# Patient Record
Sex: FEMALE | Race: BLACK OR AFRICAN AMERICAN | NOT HISPANIC OR LATINO | Employment: STUDENT | URBAN - METROPOLITAN AREA
[De-identification: names, ages, dates, MRNs, and addresses within clinical notes are randomized per-mention and may not be internally consistent; named-entity substitution may affect disease eponyms.]

---
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
## 2020-07-23 ENCOUNTER — TELEPHONE (OUTPATIENT)
Dept: PEDIATRICS | Facility: CLINIC | Age: 20
End: 2020-07-23

## 2020-07-23 NOTE — TELEPHONE ENCOUNTER
Spoke to patient, informed her shot records are ready for pickup at [facility]. Filed in front.   ----- Message from Daly Doyle sent at 7/23/2020  8:04 AM CDT -----  Contact: Patient [phone]  Requesting immunization records.    Mail to address listed in medical record:  pickup    Additional Information:  Calling to request a copy of a shot record. Patient would like a call back once shot record is ready for pickup.

## 2020-07-23 NOTE — TELEPHONE ENCOUNTER
Faxed shot records to number provided.   ----- Message from Kiesha Trujillo sent at 7/23/2020 11:54 AM CDT -----  Regarding: shot record  Contact: Tabby [phone]      Patient is calling for shot record, please fax to [phone]      Thank you

## 2021-05-19 ENCOUNTER — OFFICE VISIT (OUTPATIENT)
Dept: PRIMARY CARE CLINIC | Facility: CLINIC | Age: 21
End: 2021-05-19
Payer: COMMERCIAL

## 2021-05-19 ENCOUNTER — LAB VISIT (OUTPATIENT)
Dept: LAB | Facility: HOSPITAL | Age: 21
End: 2021-05-19
Attending: FAMILY MEDICINE
Payer: COMMERCIAL

## 2021-05-19 VITALS
OXYGEN SATURATION: 98 % | SYSTOLIC BLOOD PRESSURE: 99 MMHG | DIASTOLIC BLOOD PRESSURE: 70 MMHG | WEIGHT: 174.25 LBS | BODY MASS INDEX: 25.74 KG/M2 | HEART RATE: 74 BPM | TEMPERATURE: 98 F

## 2021-05-19 DIAGNOSIS — F33.1 MODERATE EPISODE OF RECURRENT MAJOR DEPRESSIVE DISORDER: ICD-10-CM

## 2021-05-19 DIAGNOSIS — F41.1 GAD (GENERALIZED ANXIETY DISORDER): ICD-10-CM

## 2021-05-19 DIAGNOSIS — F41.1 GAD (GENERALIZED ANXIETY DISORDER): Primary | ICD-10-CM

## 2021-05-19 LAB
ALBUMIN SERPL BCP-MCNC: 3.9 G/DL (ref 3.5–5.2)
ALP SERPL-CCNC: 82 U/L (ref 55–135)
ALT SERPL W/O P-5'-P-CCNC: 10 U/L (ref 10–44)
ANION GAP SERPL CALC-SCNC: 8 MMOL/L (ref 8–16)
AST SERPL-CCNC: 24 U/L (ref 10–40)
BASOPHILS # BLD AUTO: 0.03 K/UL (ref 0–0.2)
BASOPHILS NFR BLD: 0.3 % (ref 0–1.9)
BILIRUB SERPL-MCNC: 0.3 MG/DL (ref 0.1–1)
BUN SERPL-MCNC: 12 MG/DL (ref 6–20)
CALCIUM SERPL-MCNC: 9.8 MG/DL (ref 8.7–10.5)
CHLORIDE SERPL-SCNC: 103 MMOL/L (ref 95–110)
CO2 SERPL-SCNC: 27 MMOL/L (ref 23–29)
CREAT SERPL-MCNC: 0.9 MG/DL (ref 0.5–1.4)
DIFFERENTIAL METHOD: ABNORMAL
EOSINOPHIL # BLD AUTO: 0.1 K/UL (ref 0–0.5)
EOSINOPHIL NFR BLD: 1.4 % (ref 0–8)
ERYTHROCYTE [DISTWIDTH] IN BLOOD BY AUTOMATED COUNT: 15.4 % (ref 11.5–14.5)
EST. GFR  (AFRICAN AMERICAN): >60 ML/MIN/1.73 M^2
EST. GFR  (NON AFRICAN AMERICAN): >60 ML/MIN/1.73 M^2
ESTIMATED AVG GLUCOSE: 103 MG/DL (ref 68–131)
GLUCOSE SERPL-MCNC: 77 MG/DL (ref 70–110)
HBA1C MFR BLD: 5.2 % (ref 4–5.6)
HCT VFR BLD AUTO: 40 % (ref 37–48.5)
HGB BLD-MCNC: 12 G/DL (ref 12–16)
IMM GRANULOCYTES # BLD AUTO: 0.05 K/UL (ref 0–0.04)
IMM GRANULOCYTES NFR BLD AUTO: 0.6 % (ref 0–0.5)
LYMPHOCYTES # BLD AUTO: 2 K/UL (ref 1–4.8)
LYMPHOCYTES NFR BLD: 23.2 % (ref 18–48)
MCH RBC QN AUTO: 26.4 PG (ref 27–31)
MCHC RBC AUTO-ENTMCNC: 30 G/DL (ref 32–36)
MCV RBC AUTO: 88 FL (ref 82–98)
MONOCYTES # BLD AUTO: 0.7 K/UL (ref 0.3–1)
MONOCYTES NFR BLD: 8.4 % (ref 4–15)
NEUTROPHILS # BLD AUTO: 5.8 K/UL (ref 1.8–7.7)
NEUTROPHILS NFR BLD: 66.1 % (ref 38–73)
NRBC BLD-RTO: 0 /100 WBC
PLATELET # BLD AUTO: 283 K/UL (ref 150–450)
PMV BLD AUTO: 12.6 FL (ref 9.2–12.9)
POTASSIUM SERPL-SCNC: 4 MMOL/L (ref 3.5–5.1)
PROT SERPL-MCNC: 7.9 G/DL (ref 6–8.4)
RBC # BLD AUTO: 4.54 M/UL (ref 4–5.4)
SODIUM SERPL-SCNC: 138 MMOL/L (ref 136–145)
T4 FREE SERPL-MCNC: 0.92 NG/DL (ref 0.71–1.51)
TSH SERPL DL<=0.005 MIU/L-ACNC: 0.32 UIU/ML (ref 0.4–4)
WBC # BLD AUTO: 8.79 K/UL (ref 3.9–12.7)

## 2021-05-19 PROCEDURE — 84443 ASSAY THYROID STIM HORMONE: CPT | Performed by: FAMILY MEDICINE

## 2021-05-19 PROCEDURE — 99204 PR OFFICE/OUTPT VISIT, NEW, LEVL IV, 45-59 MIN: ICD-10-PCS | Mod: S$GLB,,, | Performed by: FAMILY MEDICINE

## 2021-05-19 PROCEDURE — 1126F AMNT PAIN NOTED NONE PRSNT: CPT | Mod: S$GLB,,, | Performed by: FAMILY MEDICINE

## 2021-05-19 PROCEDURE — 3008F BODY MASS INDEX DOCD: CPT | Mod: CPTII,S$GLB,, | Performed by: FAMILY MEDICINE

## 2021-05-19 PROCEDURE — 99999 PR PBB SHADOW E&M-EST. PATIENT-LVL IV: CPT | Mod: PBBFAC,,, | Performed by: FAMILY MEDICINE

## 2021-05-19 PROCEDURE — 3008F PR BODY MASS INDEX (BMI) DOCUMENTED: ICD-10-PCS | Mod: CPTII,S$GLB,, | Performed by: FAMILY MEDICINE

## 2021-05-19 PROCEDURE — 99999 PR PBB SHADOW E&M-EST. PATIENT-LVL IV: ICD-10-PCS | Mod: PBBFAC,,, | Performed by: FAMILY MEDICINE

## 2021-05-19 PROCEDURE — 85025 COMPLETE CBC W/AUTO DIFF WBC: CPT | Performed by: FAMILY MEDICINE

## 2021-05-19 PROCEDURE — 36415 COLL VENOUS BLD VENIPUNCTURE: CPT | Mod: PN | Performed by: FAMILY MEDICINE

## 2021-05-19 PROCEDURE — 83036 HEMOGLOBIN GLYCOSYLATED A1C: CPT | Performed by: FAMILY MEDICINE

## 2021-05-19 PROCEDURE — 80053 COMPREHEN METABOLIC PANEL: CPT | Performed by: FAMILY MEDICINE

## 2021-05-19 PROCEDURE — 99204 OFFICE O/P NEW MOD 45 MIN: CPT | Mod: S$GLB,,, | Performed by: FAMILY MEDICINE

## 2021-05-19 PROCEDURE — 1126F PR PAIN SEVERITY QUANTIFIED, NO PAIN PRESENT: ICD-10-PCS | Mod: S$GLB,,, | Performed by: FAMILY MEDICINE

## 2021-05-19 PROCEDURE — 84439 ASSAY OF FREE THYROXINE: CPT | Performed by: FAMILY MEDICINE

## 2021-05-19 RX ORDER — SERTRALINE HYDROCHLORIDE 50 MG/1
50 TABLET, FILM COATED ORAL DAILY
Qty: 30 TABLET | Refills: 11 | Status: SHIPPED | OUTPATIENT
Start: 2021-05-19 | End: 2022-05-26

## 2021-05-20 DIAGNOSIS — E05.90 HYPERTHYROIDISM: Primary | ICD-10-CM

## 2022-03-28 ENCOUNTER — TELEPHONE (OUTPATIENT)
Dept: PRIMARY CARE CLINIC | Facility: CLINIC | Age: 22
End: 2022-03-28
Payer: COMMERCIAL

## 2022-03-28 NOTE — TELEPHONE ENCOUNTER
----- Message from Zoilablossom Taylor sent at 3/28/2022  1:40 PM CDT -----  Type: Patient Call Back    Who called: self    What is the request in detail: pt called in regards to her antidepressant medication. Pt states that the pharmacy informed her that they need verification from the doctor and the pt really needs their medication. Please advise    Can the clinic reply by [facility]? No     Would the patient rather a call back or a response via [facility]? Call     Best call back number:.[phone]

## 2022-04-27 ENCOUNTER — PATIENT MESSAGE (OUTPATIENT)
Dept: ADMINISTRATIVE | Facility: HOSPITAL | Age: 22
End: 2022-04-27
Payer: COMMERCIAL

## 2022-05-26 RX ORDER — SERTRALINE HYDROCHLORIDE 50 MG/1
TABLET, FILM COATED ORAL
Qty: 90 TABLET | Refills: 0 | Status: SHIPPED | OUTPATIENT
Start: 2022-05-26 | End: 2022-09-23 | Stop reason: SDUPTHER

## 2022-05-26 NOTE — TELEPHONE ENCOUNTER
Refill Authorization Note   Tabby LEWIS Miki  is requesting a refill authorization.  Brief Assessment and Rationale for Refill:  Approve    -Medication-Related Problems Identified: Requires appointment  Medication Therapy Plan:  Needs an OV with PCP, lov 05/19/21    Medication Reconciliation Completed: No   Comments:     Provider Staff:     Action is required for this patient.   Please see care gap opportunities below in Care Due Message.     Thanks!  [facility]     Appointments      Date Provider   Last Visit   5/19/2021 Chelsea Morales MD   Next Visit   Visit date not found Chelsea Morales MD     Note composed:3:56 PM 05/26/2022           Note composed:3:56 PM 05/26/2022

## 2022-05-26 NOTE — TELEPHONE ENCOUNTER
No new care gaps identified.  [facility] Embedded Care Gaps. Reference number: [id number]. 5/26/2022   1:50:18 PM CDT

## 2022-05-27 NOTE — ED PROVIDER NOTES
Addended by: RASHARD TALBOT on: 5/27/2022 04:45 PM     Modules accepted: Orders     Encounter Date: 2019       History     Chief Complaint   Patient presents with    Female  Problem     pain and discomfort. recently seen     This is usually healthy 18-year-old girl who was diagnosed with gonorrhea and genital herpes 2 nights ago.  She was treated with azithromycin and ceftriaxone in the emergency room.  She had diarrhea as a result that but that has cleared.  She was started on valacyclovir for her genital herpes.  She continues to take that.  She was also started on metronidazole for bacterial vaginosis.  She is here because her herpes hurts so bad she can't walk, urinate, or sick comfortably.    The patient has never had herpes before.  This is a primary outbreak.  She is sexually active and has had at least 2 partners, 1 male and 1 female.  She has notified these partners of her symptoms and diagnosis.    The patient is able to urinate though it hurts.    Past medical history hospitalizations:  None  Surgeries:  None  Allergies:  None  Medications:  Metronidazole, Valcyte clear, ibuprofen  Immunizations:  Up-to-date including human papilloma virus        Review of patient's allergies indicates:  No Known Allergies  Past Medical History:   Diagnosis Date    Cough      Past Surgical History:   Procedure Laterality Date    ADENOIDECTOMY      TYMPANOSTOMY TUBE PLACEMENT       Family History   Problem Relation Age of Onset    Heart disease Mother          at 54 from heart attack    Hypertension Mother     Early death Brother          from seizure in 20's    Heart disease Maternal Aunt          from HA in 50's     Social History     Tobacco Use    Smoking status: Never Smoker    Smokeless tobacco: Never Used   Substance Use Topics    Alcohol use: Yes     Frequency: Never    Drug use: Yes     Types: Marijuana     Review of Systems   Constitutional: Negative.    HENT: Negative.    Eyes: Negative.    Respiratory: Negative.    Cardiovascular: Negative.     Gastrointestinal: Negative.    Genitourinary: Positive for dysuria, genital sores and vaginal pain. Negative for decreased urine volume and urgency.   Musculoskeletal: Negative.    Skin: Positive for rash.        Vulvar lesions consistent with genital herpes   Neurological: Negative.    Hematological: Negative.        Physical Exam     Initial Vitals [04/17/19 0344]   BP Pulse Resp Temp SpO2   -- 60 -- 98.3 °F (36.8 °C) 99 %      MAP       --         Physical Exam    Constitutional: She appears well-developed and well-nourished. She is not diaphoretic. No distress.   HENT:   Nose: Nose normal.   Mouth/Throat: Oropharynx is clear and moist.   Eyes: EOM are normal. Pupils are equal, round, and reactive to light.   Neck: Normal range of motion. Neck supple.   Cardiovascular: Normal rate, regular rhythm and normal heart sounds.   Pulmonary/Chest: Breath sounds normal.   Abdominal: Soft. Bowel sounds are normal. She exhibits no distension. There is no tenderness. There is no rebound.   Genitourinary:   Genitourinary Comments: She has multiple excoriated lesions in her vulva consistent with genital herpes.  They are small vesicles, some coalescent, on a very red base.  She does not have signs of super infection.  There is no significant pus or erythema outside the lesions.  There is no significant swelling.   Neurological: She is alert and oriented to person, place, and time. She has normal strength. GCS score is 15. GCS eye subscore is 4. GCS verbal subscore is 5. GCS motor subscore is 6.   Skin: Skin is warm and dry.   Lesions consistent with genital herpes and vulva   Psychiatric: She has a normal mood and affect.         ED Course   Procedures  Labs Reviewed - No data to display       Imaging Results    None          Medical Decision Making:   Initial Assessment:   Problem 1.:  Pain secondary to genital herpes.  She has been taking ibuprofen without relief.  She is obviously very uncomfortable especially if she  walks back to the room.  Patient was given oxycodone here.  She was discharged home with a prescription for 8 Percocet, to be used every 6 hr as needed.  Hopefully, by 2 days for the acyclovir would have started to decrease her pain    Problem 2.:  Itching associated with her genital herpes:  Patient was given a dose of Benadryl here and a prescription for same.    Problem 3.:  Std counseling:  I told the patient that she had gonorrhea in addition to her genital herpes.  We spoke at length about transmission of STDs.  She was given information about same.    Problem 4.:  Birth control counseling:  The patient does not use birth control.  She has sex with both men and women and thus is at risk for pregnancy.  I discussed her risk.  I have instructed her follow up with gynecology and ask about a full prove, easy to remember, birth control plan.  Differential Diagnosis:   Genital herpes, genital warts, syphilis                      Clinical Impression:       ICD-10-CM ICD-9-CM   1. Genital herpes simplex, unspecified site A60.00 054.10                                Micaela Sanches MD  04/17/19 0439

## 2022-09-06 ENCOUNTER — PATIENT MESSAGE (OUTPATIENT)
Dept: PRIMARY CARE CLINIC | Facility: CLINIC | Age: 22
End: 2022-09-06
Payer: COMMERCIAL

## 2022-09-12 ENCOUNTER — TELEPHONE (OUTPATIENT)
Dept: PRIMARY CARE CLINIC | Facility: CLINIC | Age: 22
End: 2022-09-12
Payer: COMMERCIAL

## 2022-09-12 NOTE — TELEPHONE ENCOUNTER
----- Message from Caity Moyer sent at 9/12/2022  1:49 PM CDT -----  Contact: Pt  Type:  RX Refill Request    Who Called:  pt   Refill or New Rx:refill   RX Name and Strength: sertraline (ZOLOFT) 50 MG tablet  How is the patient currently taking it? (ex. 1XDay):once daily   Is this a 30 day or 90 day RX: 90 days   [facility] with phone [facility]   Local or Mail Order: local   Ordering Provider: love   Would the patient rather a call back or a response via [facility]? phone  Best Call Back Number: [phone]  Additional Information:  pt is out of the medicine and would like to have it called in today and pls call when it's been called       [id number] [address] & [address] [address]  Phone: [phone] Fax: [phone]

## 2022-09-12 NOTE — TELEPHONE ENCOUNTER
Called patient and offer her an appointment with CAREY Reid due to patient has not been seen since may of 2021. Patient advised she prefer to see a provider close to [facility].

## 2022-09-12 NOTE — TELEPHONE ENCOUNTER
----- Message from Caity Moyer sent at 9/12/2022  1:49 PM CDT -----  Contact: Pt  Type:  RX Refill Request    Who Called:  pt   Refill or New Rx:refill   RX Name and Strength: sertraline (ZOLOFT) 50 MG tablet  How is the patient currently taking it? (ex. 1XDay):once daily   Is this a 30 day or 90 day RX: 90 days   Preferred Pharmacy with phone number[facility]   Local or Mail Order: local   Ordering Provider: love   Would the patient rather a call back or a response via [facility]? phone  Best Call Back Number: [phone]  Additional Information:  pt is out of the medicine and would like to have it called in today and pls call when it's been called       [id number] [address] & [address] [address]  Phone: [phone] Fax: [phone]

## 2022-09-23 ENCOUNTER — OFFICE VISIT (OUTPATIENT)
Dept: PRIMARY CARE CLINIC | Facility: CLINIC | Age: 22
End: 2022-09-23
Payer: COMMERCIAL

## 2022-09-23 VITALS
SYSTOLIC BLOOD PRESSURE: 126 MMHG | HEART RATE: 99 BPM | TEMPERATURE: 98 F | BODY MASS INDEX: 26.08 KG/M2 | HEIGHT: 69 IN | DIASTOLIC BLOOD PRESSURE: 76 MMHG | OXYGEN SATURATION: 95 % | WEIGHT: 176.06 LBS

## 2022-09-23 DIAGNOSIS — Z00.00 WELLNESS EXAMINATION: ICD-10-CM

## 2022-09-23 DIAGNOSIS — Z12.4 CERVICAL CANCER SCREENING: ICD-10-CM

## 2022-09-23 DIAGNOSIS — F41.1 GAD (GENERALIZED ANXIETY DISORDER): Primary | ICD-10-CM

## 2022-09-23 PROCEDURE — 99999 PR PBB SHADOW E&M-EST. PATIENT-LVL V: CPT | Mod: PBBFAC,,, | Performed by: PHYSICIAN ASSISTANT

## 2022-09-23 PROCEDURE — 3008F BODY MASS INDEX DOCD: CPT | Mod: CPTII,S$GLB,, | Performed by: PHYSICIAN ASSISTANT

## 2022-09-23 PROCEDURE — 3008F PR BODY MASS INDEX (BMI) DOCUMENTED: ICD-10-PCS | Mod: CPTII,S$GLB,, | Performed by: PHYSICIAN ASSISTANT

## 2022-09-23 PROCEDURE — 99999 PR PBB SHADOW E&M-EST. PATIENT-LVL V: ICD-10-PCS | Mod: PBBFAC,,, | Performed by: PHYSICIAN ASSISTANT

## 2022-09-23 PROCEDURE — 3078F PR MOST RECENT DIASTOLIC BLOOD PRESSURE < 80 MM HG: ICD-10-PCS | Mod: CPTII,S$GLB,, | Performed by: PHYSICIAN ASSISTANT

## 2022-09-23 PROCEDURE — 1160F RVW MEDS BY RX/DR IN RCRD: CPT | Mod: CPTII,S$GLB,, | Performed by: PHYSICIAN ASSISTANT

## 2022-09-23 PROCEDURE — 1160F PR REVIEW ALL MEDS BY PRESCRIBER/CLIN PHARMACIST DOCUMENTED: ICD-10-PCS | Mod: CPTII,S$GLB,, | Performed by: PHYSICIAN ASSISTANT

## 2022-09-23 PROCEDURE — 3074F SYST BP LT 130 MM HG: CPT | Mod: CPTII,S$GLB,, | Performed by: PHYSICIAN ASSISTANT

## 2022-09-23 PROCEDURE — 3078F DIAST BP <80 MM HG: CPT | Mod: CPTII,S$GLB,, | Performed by: PHYSICIAN ASSISTANT

## 2022-09-23 PROCEDURE — 1159F MED LIST DOCD IN RCRD: CPT | Mod: CPTII,S$GLB,, | Performed by: PHYSICIAN ASSISTANT

## 2022-09-23 PROCEDURE — 1159F PR MEDICATION LIST DOCUMENTED IN MEDICAL RECORD: ICD-10-PCS | Mod: CPTII,S$GLB,, | Performed by: PHYSICIAN ASSISTANT

## 2022-09-23 PROCEDURE — 3074F PR MOST RECENT SYSTOLIC BLOOD PRESSURE < 130 MM HG: ICD-10-PCS | Mod: CPTII,S$GLB,, | Performed by: PHYSICIAN ASSISTANT

## 2022-09-23 PROCEDURE — 99214 OFFICE O/P EST MOD 30 MIN: CPT | Mod: S$GLB,,, | Performed by: PHYSICIAN ASSISTANT

## 2022-09-23 PROCEDURE — 99214 PR OFFICE/OUTPT VISIT, EST, LEVL IV, 30-39 MIN: ICD-10-PCS | Mod: S$GLB,,, | Performed by: PHYSICIAN ASSISTANT

## 2022-09-23 RX ORDER — SERTRALINE HYDROCHLORIDE 50 MG/1
50 TABLET, FILM COATED ORAL DAILY
Qty: 90 TABLET | Refills: 0 | Status: SHIPPED | OUTPATIENT
Start: 2022-09-23 | End: 2022-09-23

## 2022-09-23 RX ORDER — SERTRALINE HYDROCHLORIDE 50 MG/1
50 TABLET, FILM COATED ORAL DAILY
Qty: 90 TABLET | Refills: 3 | Status: SHIPPED | OUTPATIENT
Start: 2022-09-23 | End: 2023-11-09 | Stop reason: SDUPTHER
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
## 2023-01-25 ENCOUNTER — PATIENT MESSAGE (OUTPATIENT)
Dept: ADMINISTRATIVE | Facility: HOSPITAL | Age: 23
End: 2023-01-25
Payer: COMMERCIAL

## 2023-06-13 ENCOUNTER — PATIENT OUTREACH (OUTPATIENT)
Dept: ADMINISTRATIVE | Facility: HOSPITAL | Age: 23
End: 2023-06-13
Payer: COMMERCIAL

## 2023-11-08 ENCOUNTER — TELEPHONE (OUTPATIENT)
Dept: PRIMARY CARE CLINIC | Facility: CLINIC | Age: 23
End: 2023-11-08

## 2023-11-08 NOTE — TELEPHONE ENCOUNTER
----- Message from Sandra Mayorga sent at 11/8/2023 11:12 AM CST -----  Contact: Tabby [phone]  Pt needs a refill on sertraline (ZOLOFT) 50 MG tablet called into     [id number] [place] [id number] [facility] AT [facility] & [facility]  [address] [place] [address]  Phone: [phone] Fax: [phone]      Pt mom/dad/guardian can be reached at [phone]    Tabby would like to know if she can get a refill on her RX that provider Chelsea Reid prescribes. Tabby states she is completely out and needs to get a refill asap. Please call the pt back for advice  ______________________________________________________________________________    Would like to receive medical advice.    Would they like a call back or a response via [facility]: call back     Additional information:      Tabby states if she needs to do a med check visit can it be virtual with Chelsea Reid due to being in [place] at the moment and needs to get her RX refilled. Please call Tabby back for advice.

## 2023-11-09 ENCOUNTER — OFFICE VISIT (OUTPATIENT)
Dept: FAMILY MEDICINE | Facility: CLINIC | Age: 23
End: 2023-11-09
Payer: COMMERCIAL

## 2023-11-09 VITALS
SYSTOLIC BLOOD PRESSURE: 111 MMHG | HEART RATE: 73 BPM | DIASTOLIC BLOOD PRESSURE: 75 MMHG | WEIGHT: 174 LBS | OXYGEN SATURATION: 100 % | HEIGHT: 69 IN | BODY MASS INDEX: 25.77 KG/M2

## 2023-11-09 DIAGNOSIS — F41.9 ANXIETY: ICD-10-CM

## 2023-11-09 DIAGNOSIS — Z00.00 WELLNESS EXAMINATION: ICD-10-CM

## 2023-11-09 DIAGNOSIS — Z23 ENCOUNTER FOR IMMUNIZATION: Primary | ICD-10-CM

## 2023-11-09 DIAGNOSIS — N94.6 MENSTRUAL CRAMPS: ICD-10-CM

## 2023-11-09 DIAGNOSIS — B00.1 HERPES LABIALIS: ICD-10-CM

## 2023-11-09 DIAGNOSIS — Z86.19 HISTORY OF COLD SORES: ICD-10-CM

## 2023-11-09 DIAGNOSIS — F41.1 GAD (GENERALIZED ANXIETY DISORDER): ICD-10-CM

## 2023-11-09 LAB
ALBUMIN SERPL-MCNC: 3.8 G/DL (ref 3.5–5)
ALBUMIN/GLOB SERPL: 1.2 RATIO (ref 1.1–2)
ALP SERPL-CCNC: 49 UNIT/L (ref 40–150)
ALT SERPL-CCNC: 70 UNIT/L (ref 0–55)
APPEARANCE UR: CLEAR
AST SERPL-CCNC: 293 UNIT/L (ref 5–34)
BACTERIA #/AREA URNS AUTO: ABNORMAL /HPF
BASOPHILS # BLD AUTO: 0.06 X10(3)/MCL
BASOPHILS NFR BLD AUTO: 0.5 %
BILIRUB SERPL-MCNC: 0.7 MG/DL
BILIRUB UR QL STRIP.AUTO: NEGATIVE
BUN SERPL-MCNC: 9.6 MG/DL (ref 7–18.7)
CALCIUM SERPL-MCNC: 9.3 MG/DL (ref 8.4–10.2)
CHLORIDE SERPL-SCNC: 107 MMOL/L (ref 98–107)
CHOLEST SERPL-MCNC: 141 MG/DL
CHOLEST/HDLC SERPL: 2 {RATIO} (ref 0–5)
CO2 SERPL-SCNC: 27 MMOL/L (ref 22–29)
COLOR UR AUTO: ABNORMAL
CREAT SERPL-MCNC: 0.98 MG/DL (ref 0.55–1.02)
DEPRECATED CALCIDIOL+CALCIFEROL SERPL-MC: 11.4 NG/ML (ref 30–80)
EOSINOPHIL # BLD AUTO: 0.18 X10(3)/MCL (ref 0–0.9)
EOSINOPHIL NFR BLD AUTO: 1.6 %
ERYTHROCYTE [DISTWIDTH] IN BLOOD BY AUTOMATED COUNT: 16 % (ref 11.5–17)
EST. AVERAGE GLUCOSE BLD GHB EST-MCNC: 102.5 MG/DL
GFR SERPLBLD CREATININE-BSD FMLA CKD-EPI: >60 MLS/MIN/1.73/M2
GLOBULIN SER-MCNC: 3.2 GM/DL (ref 2.4–3.5)
GLUCOSE SERPL-MCNC: 72 MG/DL (ref 74–100)
GLUCOSE UR QL STRIP.AUTO: NORMAL
HBA1C MFR BLD: 5.2 %
HCT VFR BLD AUTO: 37.7 % (ref 37–47)
HDLC SERPL-MCNC: 65 MG/DL (ref 35–60)
HGB BLD-MCNC: 11.2 G/DL (ref 12–16)
HYALINE CASTS #/AREA URNS LPF: ABNORMAL /LPF
IMM GRANULOCYTES # BLD AUTO: 0.04 X10(3)/MCL (ref 0–0.04)
IMM GRANULOCYTES NFR BLD AUTO: 0.4 %
KETONES UR QL STRIP.AUTO: NEGATIVE
LDLC SERPL CALC-MCNC: 67 MG/DL (ref 50–140)
LEUKOCYTE ESTERASE UR QL STRIP.AUTO: NEGATIVE
LYMPHOCYTES # BLD AUTO: 2.37 X10(3)/MCL (ref 0.6–4.6)
LYMPHOCYTES NFR BLD AUTO: 21.3 %
MCH RBC QN AUTO: 25 PG (ref 27–31)
MCHC RBC AUTO-ENTMCNC: 29.7 G/DL (ref 33–36)
MCV RBC AUTO: 84.2 FL (ref 80–94)
MONOCYTES # BLD AUTO: 0.78 X10(3)/MCL (ref 0.1–1.3)
MONOCYTES NFR BLD AUTO: 7 %
MUCOUS THREADS URNS QL MICRO: ABNORMAL /LPF
NEUTROPHILS # BLD AUTO: 7.69 X10(3)/MCL (ref 2.1–9.2)
NEUTROPHILS NFR BLD AUTO: 69.2 %
NITRITE UR QL STRIP.AUTO: NEGATIVE
NRBC BLD AUTO-RTO: 0 %
PH UR STRIP.AUTO: 5.5 [PH]
PLATELET # BLD AUTO: 285 X10(3)/MCL (ref 130–400)
PMV BLD AUTO: 11.7 FL (ref 7.4–10.4)
POTASSIUM SERPL-SCNC: 4.7 MMOL/L (ref 3.5–5.1)
PROT SERPL-MCNC: 7 GM/DL (ref 6.4–8.3)
PROT UR QL STRIP.AUTO: ABNORMAL
RBC # BLD AUTO: 4.48 X10(6)/MCL (ref 4.2–5.4)
RBC #/AREA URNS AUTO: ABNORMAL /HPF
RBC UR QL AUTO: NEGATIVE
SODIUM SERPL-SCNC: 140 MMOL/L (ref 136–145)
SP GR UR STRIP.AUTO: 1.02 (ref 1–1.03)
SQUAMOUS #/AREA URNS LPF: ABNORMAL /HPF
T4 FREE SERPL-MCNC: 0.92 NG/DL (ref 0.7–1.48)
TRIGL SERPL-MCNC: 43 MG/DL (ref 37–140)
TSH SERPL-ACNC: 1.06 UIU/ML (ref 0.35–4.94)
UROBILINOGEN UR STRIP-ACNC: NORMAL
VLDLC SERPL CALC-MCNC: 9 MG/DL
WBC # SPEC AUTO: 11.12 X10(3)/MCL (ref 4.5–11.5)
WBC #/AREA URNS AUTO: ABNORMAL /HPF

## 2023-11-09 PROCEDURE — 99395 PR PREVENTIVE VISIT,EST,18-39: ICD-10-PCS | Mod: S$PBB,,, | Performed by: STUDENT IN AN ORGANIZED HEALTH CARE EDUCATION/TRAINING PROGRAM

## 2023-11-09 PROCEDURE — 82306 VITAMIN D 25 HYDROXY: CPT | Performed by: STUDENT IN AN ORGANIZED HEALTH CARE EDUCATION/TRAINING PROGRAM

## 2023-11-09 PROCEDURE — 3074F PR MOST RECENT SYSTOLIC BLOOD PRESSURE < 130 MM HG: ICD-10-PCS | Mod: CPTII,,, | Performed by: STUDENT IN AN ORGANIZED HEALTH CARE EDUCATION/TRAINING PROGRAM

## 2023-11-09 PROCEDURE — 99395 PREV VISIT EST AGE 18-39: CPT | Mod: S$PBB,,, | Performed by: STUDENT IN AN ORGANIZED HEALTH CARE EDUCATION/TRAINING PROGRAM

## 2023-11-09 PROCEDURE — 84439 ASSAY OF FREE THYROXINE: CPT | Performed by: STUDENT IN AN ORGANIZED HEALTH CARE EDUCATION/TRAINING PROGRAM

## 2023-11-09 PROCEDURE — 81001 URINALYSIS AUTO W/SCOPE: CPT | Performed by: STUDENT IN AN ORGANIZED HEALTH CARE EDUCATION/TRAINING PROGRAM

## 2023-11-09 PROCEDURE — 3078F PR MOST RECENT DIASTOLIC BLOOD PRESSURE < 80 MM HG: ICD-10-PCS | Mod: CPTII,,, | Performed by: STUDENT IN AN ORGANIZED HEALTH CARE EDUCATION/TRAINING PROGRAM

## 2023-11-09 PROCEDURE — 85025 COMPLETE CBC W/AUTO DIFF WBC: CPT | Performed by: STUDENT IN AN ORGANIZED HEALTH CARE EDUCATION/TRAINING PROGRAM

## 2023-11-09 PROCEDURE — 83036 HEMOGLOBIN GLYCOSYLATED A1C: CPT | Performed by: STUDENT IN AN ORGANIZED HEALTH CARE EDUCATION/TRAINING PROGRAM

## 2023-11-09 PROCEDURE — 3074F SYST BP LT 130 MM HG: CPT | Mod: CPTII,,, | Performed by: STUDENT IN AN ORGANIZED HEALTH CARE EDUCATION/TRAINING PROGRAM

## 2023-11-09 PROCEDURE — 80061 LIPID PANEL: CPT | Performed by: STUDENT IN AN ORGANIZED HEALTH CARE EDUCATION/TRAINING PROGRAM

## 2023-11-09 PROCEDURE — 90471 IMMUNIZATION ADMIN: CPT | Mod: PBBFAC,PN

## 2023-11-09 PROCEDURE — 99213 OFFICE O/P EST LOW 20 MIN: CPT | Mod: PBBFAC,PN | Performed by: STUDENT IN AN ORGANIZED HEALTH CARE EDUCATION/TRAINING PROGRAM

## 2023-11-09 PROCEDURE — 3008F PR BODY MASS INDEX (BMI) DOCUMENTED: ICD-10-PCS | Mod: CPTII,,, | Performed by: STUDENT IN AN ORGANIZED HEALTH CARE EDUCATION/TRAINING PROGRAM

## 2023-11-09 PROCEDURE — 3008F BODY MASS INDEX DOCD: CPT | Mod: CPTII,,, | Performed by: STUDENT IN AN ORGANIZED HEALTH CARE EDUCATION/TRAINING PROGRAM

## 2023-11-09 PROCEDURE — 1159F PR MEDICATION LIST DOCUMENTED IN MEDICAL RECORD: ICD-10-PCS | Mod: CPTII,,, | Performed by: STUDENT IN AN ORGANIZED HEALTH CARE EDUCATION/TRAINING PROGRAM

## 2023-11-09 PROCEDURE — 80053 COMPREHEN METABOLIC PANEL: CPT | Performed by: STUDENT IN AN ORGANIZED HEALTH CARE EDUCATION/TRAINING PROGRAM

## 2023-11-09 PROCEDURE — 84443 ASSAY THYROID STIM HORMONE: CPT | Performed by: STUDENT IN AN ORGANIZED HEALTH CARE EDUCATION/TRAINING PROGRAM

## 2023-11-09 PROCEDURE — 3078F DIAST BP <80 MM HG: CPT | Mod: CPTII,,, | Performed by: STUDENT IN AN ORGANIZED HEALTH CARE EDUCATION/TRAINING PROGRAM

## 2023-11-09 PROCEDURE — 1159F MED LIST DOCD IN RCRD: CPT | Mod: CPTII,,, | Performed by: STUDENT IN AN ORGANIZED HEALTH CARE EDUCATION/TRAINING PROGRAM

## 2023-11-09 PROCEDURE — 36415 COLL VENOUS BLD VENIPUNCTURE: CPT | Performed by: STUDENT IN AN ORGANIZED HEALTH CARE EDUCATION/TRAINING PROGRAM

## 2023-11-09 RX ORDER — SERTRALINE HYDROCHLORIDE 50 MG/1
50 TABLET, FILM COATED ORAL DAILY
Qty: 90 TABLET | Refills: 3 | Status: SHIPPED | OUTPATIENT
Start: 2023-11-09 | End: 2024-02-29

## 2023-11-09 RX ORDER — VALACYCLOVIR HYDROCHLORIDE 1 G/1
2000 TABLET, FILM COATED ORAL EVERY 12 HOURS
Qty: 4 TABLET | Refills: 11 | Status: SHIPPED | OUTPATIENT
Start: 2023-11-09 | End: 2023-11-21

## 2023-11-09 RX ORDER — IBUPROFEN 800 MG/1
800 TABLET ORAL EVERY 8 HOURS PRN
Qty: 90 TABLET | Refills: 11 | Status: SHIPPED | OUTPATIENT
Start: 2023-11-09

## 2023-11-09 RX ORDER — IBUPROFEN 800 MG/1
800 TABLET ORAL EVERY 8 HOURS PRN
Qty: 90 TABLET | Refills: 11 | Status: SHIPPED | OUTPATIENT
Start: 2023-11-09 | End: 2023-11-09 | Stop reason: SDUPTHER

## 2023-11-09 NOTE — PROGRESS NOTES
Patient Name: Tabby Livingston     : 2000    MRN: [id number]     Subjective:     Patient ID: Tabby Livingston is a 23 y.o. female.    Chief Complaint:   Chief Complaint   Patient presents with    \[facility]\"" Care     Patient needs refills on sertraline 50mg tablets. She also needs refills of valtrex. Patient complains of lots of cramps during cycle.Wants to know what she can do for this. /75. Patient also states that she would like to get the flu shot.         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 [employer]      Patient reports a history of anxiety and is asking for refills of Zoloft 50 mg daily   No SI or HI feels that this dose is good for her      Oral labial cold sores  Reports infrequent outbreaks but would like to keep Valtrex on hand      Menstrual cramps  States that they are relieved by ibuprofen 800 mg is asking for fill    Patient does not have concern for sexually transmitted infections is sexually active women  Patient's family history is significant for hypertension and coronary artery disease   Reports that her mother passed away from a heart attack   Surgical history-denies social history-nonsmoker of cigarettes  ROS:            ROS     12 point review of systems conducted, negative except as stated in the history of present illness. See HPI for details.    History:     History reviewed. No pertinent past medical history.     Past Surgical History:   Procedure Laterality Date    ADENOIDECTOMY      TYMPANOSTOMY TUBE PLACEMENT         Family History   Problem Relation Age of Onset    Heart disease Mother          at 54 from heart attack    Hypertension Mother     Early death Brother          from seizure in     Heart disease Maternal Aunt          from HA in 50's        Social History  "    Tobacco Use    Smoking status: Never    Smokeless tobacco: Never   Substance and Sexual Activity    Alcohol use: Yes    Drug use: Yes     Types: Marijuana    Sexual activity: Yes     Partners: Male, Female       Current Outpatient Medications   Medication Instructions    ibuprofen (ADVIL,MOTRIN) 800 mg, Oral, Every 8 hours PRN    sertraline (ZOLOFT) 50 mg, Oral, Daily    valACYclovir (VALTREX) 2,000 mg, Oral, Every 12 hours, Extend to 10 days if lesions have not healed completely        Review of patient's allergies indicates:  No Known Allergies    Objective:     Visit Vitals  /75 (BP Location: Left arm, Patient Position: Sitting)   Pulse 73   Ht 5' 9" (1.753 m)   Wt 78.9 kg (174 lb)   LMP 10/27/2023 (Approximate)   SpO2 100%   BMI 25.70 kg/m²       Physical Examination:     Physical Exam  Constitutional:       General: She is not in acute distress.     Appearance: Normal appearance. She is not ill-appearing or diaphoretic.   HENT:      Nose: No congestion.   Eyes:      Conjunctiva/sclera: Conjunctivae normal.      Pupils: Pupils are equal, round, and reactive to light.   Cardiovascular:      Rate and Rhythm: Normal rate and regular rhythm.      Heart sounds: No murmur heard.  Pulmonary:      Effort: Pulmonary effort is normal. No respiratory distress.      Breath sounds: Normal breath sounds. No wheezing.   Musculoskeletal:         General: No swelling, tenderness, deformity or signs of injury. Normal range of motion.      Cervical back: Normal range of motion.   Skin:     General: Skin is warm and dry.      Coloration: Skin is not jaundiced.   Neurological:      General: No focal deficit present.      Mental Status: She is alert and oriented to person, place, and time. Mental status is at baseline.      Gait: Gait normal.   Psychiatric:         Mood and Affect: Mood normal.         Behavior: Behavior normal.         Thought Content: Thought content normal.         Lab Results:     Chemistry:  Lab " Results   Component Value Date     05/19/2021    K 4.0 05/19/2021    BUN 12 05/19/2021    CREATININE 0.9 05/19/2021    CALCIUM 9.8 05/19/2021    ALKPHOS 82 05/19/2021    ALBUMIN 3.9 05/19/2021    BILIDIR 0.4 (H) 04/13/2015    AST 24 05/19/2021    ALT 10 05/19/2021    TSH 0.323 (L) 05/19/2021        Lab Results   Component Value Date    HGBA1C 5.2 05/19/2021        Hematology:  Lab Results   Component Value Date    WBC 8.79 05/19/2021    HGB 12.0 05/19/2021    HCT 40.0 05/19/2021     05/19/2021       Lipid Panel:  Lab Results   Component Value Date    CHOL 142 01/16/2018    HDL 73 01/16/2018        Urine:  Lab Results   Component Value Date    APPEARANCEUA Hazy (A) 04/14/2019    PROTEINUA Trace (A) 04/14/2019    LEUKOCYTESUR 1+ (A) 04/14/2019    WBCUA 35 (H) 04/14/2019    BACTERIA Moderate (A) 04/14/2019        Assessment:          ICD-10-CM ICD-9-CM   1. Encounter for immunization  Z23 V03.89   2. Wellness examination  Z00.00 V70.0   3. RAGHU (generalized anxiety disorder)  F41.1 300.02   4. Herpes labialis  B00.1 054.9   5. Menstrual cramps  N94.6 625.3   6. Anxiety  F41.9 300.00   7. History of cold sores  Z86.19 V12.09        Plan:     1. Encounter for immunization  -     Influenza - Quadrivalent *Preferred* (6 months+) (PF)  -     Discontinue: ibuprofen (ADVIL,MOTRIN) 800 MG tablet; Take 1 tablet (800 mg total) by mouth every 8 (eight) hours as needed for Pain.  Dispense: 90 tablet; Refill: 11  -     ibuprofen (ADVIL,MOTRIN) 800 MG tablet; Take 1 tablet (800 mg total) by mouth every 8 (eight) hours as needed for Pain.  Dispense: 90 tablet; Refill: 11    2. Wellness examination  Assessment & Plan:  Labs as below    Orders:  -     CBC Auto Differential; Future; Expected date: 11/09/2023  -     Comprehensive Metabolic Panel; Future; Expected date: 11/09/2023  -     Lipid Panel; Future; Expected date: 11/09/2023  -     TSH; Future; Expected date: 11/09/2023  -     Hemoglobin A1C; Future; Expected date:  11/09/2023  -     Urinalysis; Future; Expected date: 11/09/2023  -     T4, Free; Future; Expected date: 11/09/2023  -     Vitamin D; Future; Expected date: 11/09/2023    3. RAGHU (generalized anxiety disorder)  Comments:  chronic, with acute exacerbation, restart zoloft 50 mg, follow-up with psychiatry   Orders:  -     sertraline (ZOLOFT) 50 MG tablet; Take 1 tablet (50 mg total) by mouth once daily.  Dispense: 90 tablet; Refill: 3    4. Herpes labialis  -     valACYclovir (VALTREX) 1000 MG tablet; Take 2 tablets (2,000 mg total) by mouth every 12 (twelve) hours. Extend to 10 days if lesions have not healed completely for 12 days  Dispense: 4 tablet; Refill: 11    5. Menstrual cramps  Assessment & Plan:  Ibuprofen 800 mg prescribed for patient t.i.d. p.r.n. for cramps      6. Anxiety  Assessment & Plan:  Refill sertraline 50 mg daily for patient   Also checking TSH T4   Chart reports indicate that at 1 point patient did have a depressed TSH indicates that she has been followed up for this and it is now normal      Patient did ask for resources for counseling  Information sheets given lb [facility]      7. History of cold sores  Assessment & Plan:  Valtrex prescribed for patient to keep on hand           Follow up in about 1 month (around 12/9/2023) for lab results and Pap smear.    Future Appointments   Date Time Provider Department Center   12/6/2023 10:40 AM Haydee Longoria MD [facility]        Haydee Longoria MD

## 2023-11-09 NOTE — ASSESSMENT & PLAN NOTE
Refill sertraline 50 mg daily for patient   Also checking TSH T4   Chart reports indicate that at 1 point patient did have a depressed TSH indicates that she has been followed up for this and it is now normal      Patient did ask for resources for counseling  Information sheets given amee GT[facility]

## 2024-02-12 PROBLEM — Z00.00 WELLNESS EXAMINATION: Status: RESOLVED | Noted: 2023-11-09 | Resolved: 2024-02-12

## 2024-02-22 DIAGNOSIS — Z11.8 SCREENING FOR CHLAMYDIAL DISEASE: Primary | ICD-10-CM

## 2024-02-29 ENCOUNTER — OFFICE VISIT (OUTPATIENT)
Dept: FAMILY MEDICINE | Facility: CLINIC | Age: 24
End: 2024-02-29
Payer: COMMERCIAL

## 2024-02-29 VITALS
HEIGHT: 69 IN | TEMPERATURE: 98 F | OXYGEN SATURATION: 100 % | DIASTOLIC BLOOD PRESSURE: 84 MMHG | HEART RATE: 83 BPM | SYSTOLIC BLOOD PRESSURE: 130 MMHG | BODY MASS INDEX: 25.7 KG/M2

## 2024-02-29 DIAGNOSIS — Z11.8 SCREENING FOR CHLAMYDIAL DISEASE: ICD-10-CM

## 2024-02-29 DIAGNOSIS — F41.9 ANXIETY: Primary | ICD-10-CM

## 2024-02-29 DIAGNOSIS — F41.9 ANXIETY AND DEPRESSION: ICD-10-CM

## 2024-02-29 DIAGNOSIS — R74.8 ELEVATED LIVER ENZYMES: ICD-10-CM

## 2024-02-29 DIAGNOSIS — F32.A ANXIETY AND DEPRESSION: ICD-10-CM

## 2024-02-29 DIAGNOSIS — N93.9 ABNORMAL UTERINE BLEEDING (AUB): ICD-10-CM

## 2024-02-29 DIAGNOSIS — N92.0 MENORRHAGIA WITH REGULAR CYCLE: ICD-10-CM

## 2024-02-29 LAB
ALBUMIN SERPL-MCNC: 3.7 G/DL (ref 3.5–5)
ALBUMIN/GLOB SERPL: 1.1 RATIO (ref 1.1–2)
ALP SERPL-CCNC: 58 UNIT/L (ref 40–150)
ALT SERPL-CCNC: 8 UNIT/L (ref 0–55)
AST SERPL-CCNC: 20 UNIT/L (ref 5–34)
BASOPHILS # BLD AUTO: 0.07 X10(3)/MCL
BASOPHILS NFR BLD AUTO: 0.4 %
BILIRUB SERPL-MCNC: 0.7 MG/DL
BUN SERPL-MCNC: 18.9 MG/DL (ref 7–18.7)
C TRACH DNA SPEC QL NAA+PROBE: NOT DETECTED
CALCIUM SERPL-MCNC: 8.6 MG/DL (ref 8.4–10.2)
CHLORIDE SERPL-SCNC: 109 MMOL/L (ref 98–107)
CO2 SERPL-SCNC: 25 MMOL/L (ref 22–29)
CREAT SERPL-MCNC: 0.83 MG/DL (ref 0.55–1.02)
EOSINOPHIL # BLD AUTO: 0.09 X10(3)/MCL (ref 0–0.9)
EOSINOPHIL NFR BLD AUTO: 0.5 %
ERYTHROCYTE [DISTWIDTH] IN BLOOD BY AUTOMATED COUNT: 16 % (ref 11.5–17)
GFR SERPLBLD CREATININE-BSD FMLA CKD-EPI: >60 MLS/MIN/1.73/M2
GLOBULIN SER-MCNC: 3.3 GM/DL (ref 2.4–3.5)
GLUCOSE SERPL-MCNC: 74 MG/DL (ref 74–100)
HCT VFR BLD AUTO: 35.4 % (ref 37–47)
HGB BLD-MCNC: 10.6 G/DL (ref 12–16)
IMM GRANULOCYTES # BLD AUTO: 0.08 X10(3)/MCL (ref 0–0.04)
IMM GRANULOCYTES NFR BLD AUTO: 0.4 %
IRON SATN MFR SERPL: 27 % (ref 20–50)
IRON SERPL-MCNC: 93 UG/DL (ref 50–170)
LYMPHOCYTES # BLD AUTO: 1.89 X10(3)/MCL (ref 0.6–4.6)
LYMPHOCYTES NFR BLD AUTO: 10.1 %
MCH RBC QN AUTO: 25.5 PG (ref 27–31)
MCHC RBC AUTO-ENTMCNC: 29.9 G/DL (ref 33–36)
MCV RBC AUTO: 85.1 FL (ref 80–94)
MONOCYTES # BLD AUTO: 1.47 X10(3)/MCL (ref 0.1–1.3)
MONOCYTES NFR BLD AUTO: 7.8 %
N GONORRHOEA DNA SPEC QL NAA+PROBE: NOT DETECTED
NEUTROPHILS # BLD AUTO: 15.2 X10(3)/MCL (ref 2.1–9.2)
NEUTROPHILS NFR BLD AUTO: 80.8 %
NRBC BLD AUTO-RTO: 0 %
PLATELET # BLD AUTO: 299 X10(3)/MCL (ref 130–400)
PMV BLD AUTO: 11 FL (ref 7.4–10.4)
POTASSIUM SERPL-SCNC: 4 MMOL/L (ref 3.5–5.1)
PROT SERPL-MCNC: 7 GM/DL (ref 6.4–8.3)
RBC # BLD AUTO: 4.16 X10(6)/MCL (ref 4.2–5.4)
RET# (OHS): 0.06 X10E6/UL (ref 0.02–0.08)
RETICULOCYTE COUNT AUTOMATED (OLG): 1.46 % (ref 1.1–2.1)
SODIUM SERPL-SCNC: 141 MMOL/L (ref 136–145)
SOURCE (OHS): NORMAL
TIBC SERPL-MCNC: 257 UG/DL (ref 70–310)
TIBC SERPL-MCNC: 350 UG/DL (ref 250–450)
TRANSFERRIN SERPL-MCNC: 323 MG/DL (ref 180–382)
VIT B12 SERPL-MCNC: 542 PG/ML (ref 213–816)
WBC # SPEC AUTO: 18.8 X10(3)/MCL (ref 4.5–11.5)

## 2024-02-29 PROCEDURE — 83540 ASSAY OF IRON: CPT | Performed by: STUDENT IN AN ORGANIZED HEALTH CARE EDUCATION/TRAINING PROGRAM

## 2024-02-29 PROCEDURE — 3008F BODY MASS INDEX DOCD: CPT | Mod: CPTII,,, | Performed by: STUDENT IN AN ORGANIZED HEALTH CARE EDUCATION/TRAINING PROGRAM

## 2024-02-29 PROCEDURE — 3075F SYST BP GE 130 - 139MM HG: CPT | Mod: CPTII,,, | Performed by: STUDENT IN AN ORGANIZED HEALTH CARE EDUCATION/TRAINING PROGRAM

## 2024-02-29 PROCEDURE — 99214 OFFICE O/P EST MOD 30 MIN: CPT | Mod: PBBFAC,PN | Performed by: STUDENT IN AN ORGANIZED HEALTH CARE EDUCATION/TRAINING PROGRAM

## 2024-02-29 PROCEDURE — 3079F DIAST BP 80-89 MM HG: CPT | Mod: CPTII,,, | Performed by: STUDENT IN AN ORGANIZED HEALTH CARE EDUCATION/TRAINING PROGRAM

## 2024-02-29 PROCEDURE — 82607 VITAMIN B-12: CPT | Performed by: STUDENT IN AN ORGANIZED HEALTH CARE EDUCATION/TRAINING PROGRAM

## 2024-02-29 PROCEDURE — 85045 AUTOMATED RETICULOCYTE COUNT: CPT | Performed by: STUDENT IN AN ORGANIZED HEALTH CARE EDUCATION/TRAINING PROGRAM

## 2024-02-29 PROCEDURE — 85025 COMPLETE CBC W/AUTO DIFF WBC: CPT | Performed by: STUDENT IN AN ORGANIZED HEALTH CARE EDUCATION/TRAINING PROGRAM

## 2024-02-29 PROCEDURE — 1159F MED LIST DOCD IN RCRD: CPT | Mod: CPTII,,, | Performed by: STUDENT IN AN ORGANIZED HEALTH CARE EDUCATION/TRAINING PROGRAM

## 2024-02-29 PROCEDURE — 80053 COMPREHEN METABOLIC PANEL: CPT | Performed by: STUDENT IN AN ORGANIZED HEALTH CARE EDUCATION/TRAINING PROGRAM

## 2024-02-29 PROCEDURE — 36415 COLL VENOUS BLD VENIPUNCTURE: CPT | Performed by: STUDENT IN AN ORGANIZED HEALTH CARE EDUCATION/TRAINING PROGRAM

## 2024-02-29 PROCEDURE — 87491 CHLMYD TRACH DNA AMP PROBE: CPT | Performed by: STUDENT IN AN ORGANIZED HEALTH CARE EDUCATION/TRAINING PROGRAM

## 2024-02-29 PROCEDURE — 99214 OFFICE O/P EST MOD 30 MIN: CPT | Mod: S$PBB,,, | Performed by: STUDENT IN AN ORGANIZED HEALTH CARE EDUCATION/TRAINING PROGRAM

## 2024-02-29 RX ORDER — BUPROPION HYDROCHLORIDE 300 MG/1
300 TABLET ORAL DAILY
Qty: 90 TABLET | Refills: 3 | Status: ON HOLD | OUTPATIENT
Start: 2024-02-29 | End: 2024-05-17

## 2024-02-29 RX ORDER — BUPROPION HYDROCHLORIDE 150 MG/1
150 TABLET ORAL
COMMUNITY
Start: 2024-02-09 | End: 2024-02-29

## 2024-02-29 NOTE — ASSESSMENT & PLAN NOTE
Increasing Wellbutrin to 300 mg daily   Encourage patient to continue to follow with counselor at [facility]

## 2024-02-29 NOTE — PROGRESS NOTES
Patient Name: Tabby Livingston     : 2000    MRN: [id number]     Subjective:     Patient ID: Tabby Livingston is a 23 y.o. female.    Chief Complaint:   Chief Complaint   Patient presents with    Follow-up     Patient here for follow up. No problems today. Wellbutrin is helping her better than zoloft. She stop taking zoloft. Does not want pap today. /84.         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 [employer]    Interval history  Patient declines Pap smear today has missed appointments since establishing care reports that she [facility] secondary to HI after wanting to kill her sister with the plan after finding out the sister spent her inheritance  While inpatient patient reports that she was titrated off of Zoloft and started on Wellbutrin is now taking Wellbutrin 150 mg XL is asking to increase states that her depression and anxiety are both much better no longer has SI or HI  Has established with therapist at  [facility]  States that event that occurred happened after patient had stopped Zoloft cold turkey and had stopped vaping and marijuana use at the same time    Oral labial cold sores  Reports infrequent outbreaks but would like to keep Valtrex on hand  Elevated liver enzymes  Noted on recent lab work patient reports that she had been drinking heavily but has since stopped drinking    Menstrual cramps  States that they are relieved by ibuprofen 800 mg is asking for fill    Patient does not have concern for sexually transmitted infections is sexually active women  Patient's family history is significant for hypertension and coronary artery disease   Reports that her mother passed away from a heart attack   Surgical history-denies social history-nonsmoker of cigarettes  ROS:  "     ROS     12 point review of systems conducted, negative except as stated in the history of present illness. See HPI for details.    History:     History reviewed. No pertinent past medical history.     Past Surgical History:   Procedure Laterality Date    ADENOIDECTOMY      TYMPANOSTOMY TUBE PLACEMENT         Family History   Problem Relation Age of Onset    Heart disease Mother          at 54 from heart attack    Hypertension Mother     Early death Brother          from seizure in 20's    Heart disease Maternal Aunt          from HA in 50's        Social History     Tobacco Use    Smoking status: Never    Smokeless tobacco: Never   Substance and Sexual Activity    Alcohol use: Yes    Drug use: Yes     Types: Marijuana    Sexual activity: Yes     Partners: Male, Female       Current Outpatient Medications   Medication Instructions    buPROPion (WELLBUTRIN XL) 300 mg, Oral, Daily    ibuprofen (ADVIL,MOTRIN) 800 mg, Oral, Every 8 hours PRN    valACYclovir (VALTREX) 2,000 mg, Oral, Every 12 hours, Extend to 10 days if lesions have not healed completely        Review of patient's allergies indicates:  No Known Allergies    Objective:     Visit Vitals  /84 (BP Location: Right arm, Patient Position: Sitting)   Pulse 83   Temp 97.6 °F (36.4 °C) (Oral)   Ht 5' 9" (1.753 m)   LMP 2024 (Exact Date)   SpO2 100%   BMI 25.70 kg/m²       Physical Examination:     Physical Exam  Constitutional:       General: She is not in acute distress.     Appearance: Normal appearance. She is not ill-appearing or diaphoretic.   HENT:      Nose: No congestion.   Eyes:      Conjunctiva/sclera: Conjunctivae normal.      Pupils: Pupils are equal, round, and reactive to light.   Cardiovascular:      Rate and Rhythm: Normal rate and regular rhythm.      Heart sounds: No murmur heard.  Pulmonary:      Effort: Pulmonary effort is normal. No respiratory distress.      Breath sounds: Normal breath sounds. No wheezing. "   Musculoskeletal:         General: No swelling, tenderness, deformity or signs of injury. Normal range of motion.      Cervical back: Normal range of motion.   Skin:     General: Skin is warm and dry.      Coloration: Skin is not jaundiced.   Neurological:      General: No focal deficit present.      Mental Status: She is alert and oriented to person, place, and time. Mental status is at baseline.      Gait: Gait normal.         Lab Results:     Chemistry:  Lab Results   Component Value Date     11/09/2023    K 4.7 11/09/2023    CHLORIDE 107 11/09/2023    BUN 9.6 11/09/2023    CREATININE 0.98 11/09/2023    EGFRNORACEVR >60 11/09/2023    GLUCOSE 72 (L) 11/09/2023    CALCIUM 9.3 11/09/2023    ALKPHOS 49 11/09/2023    LABPROT 7.0 11/09/2023    ALBUMIN 3.8 11/09/2023    BILIDIR 0.4 (H) 04/13/2015     (H) 11/09/2023    ALT 70 (H) 11/09/2023    IBLHGVRZ92YR 11.4 (L) 11/09/2023    TSH 1.058 11/09/2023    LDQLEM9ABGX 0.92 11/09/2023        Lab Results   Component Value Date    HGBA1C 5.2 11/09/2023        Hematology:  Lab Results   Component Value Date    WBC 11.12 11/09/2023    HGB 11.2 (L) 11/09/2023    HCT 37.7 11/09/2023     11/09/2023       Lipid Panel:  Lab Results   Component Value Date    CHOL 141 11/09/2023    HDL 65 (H) 11/09/2023    LDL 67.00 11/09/2023    TRIG 43 11/09/2023    TOTALCHOLEST 2 11/09/2023        Urine:  Lab Results   Component Value Date    COLORUA Light-Yellow 11/09/2023    APPEARANCEUA Clear 11/09/2023    SGUA 1.021 11/09/2023    PHUA 5.5 11/09/2023    PROTEINUA Trace (A) 11/09/2023    GLUCOSEUA Normal 11/09/2023    KETONESUA Negative 11/09/2023    BLOODUA Negative 11/09/2023    NITRITESUA Negative 11/09/2023    LEUKOCYTESUR Negative 11/09/2023    RBCUA None Seen 11/09/2023    WBCUA 0-5 11/09/2023    BACTERIA None Seen 11/09/2023    SQEPUA Trace (A) 11/09/2023    HYALINECASTS None Seen 11/09/2023        Assessment:          ICD-10-CM ICD-9-CM   1. Anxiety  F41.9 300.00   2.  Abnormal uterine bleeding (AUB)  N93.9 626.9   3. Elevated liver enzymes  R74.8 790.5   4. Screening for chlamydial disease  Z11.8 V73.98   5. Menorrhagia with regular cycle  N92.0 626.2   6. Anxiety and depression  F41.9 300.00    F32.A 311        Plan:     1. Anxiety  -     buPROPion (WELLBUTRIN XL) 300 MG 24 hr tablet; Take 1 tablet (300 mg total) by mouth once daily.  Dispense: 90 tablet; Refill: 3    2. Abnormal uterine bleeding (AUB)  -     CBC Auto Differential; Future; Expected date: 02/29/2024  -     Reticulocytes; Future; Expected date: 02/29/2024  -     Iron and TIBC; Future; Expected date: 02/29/2024  -     Vitamin B12; Future; Expected date: 02/29/2024  -     Ambulatory referral/consult to Gynecology; Future; Expected date: 03/07/2024    3. Elevated liver enzymes  Assessment & Plan:  CMP  ETOH cessation  US liver    Orders:  -     Comprehensive Metabolic Panel; Future; Expected date: 02/29/2024  -     US Abdomen Limited; Future; Expected date: 02/29/2024    4. Screening for chlamydial disease  -     Chlamydia/GC, PCR    5. Menorrhagia with regular cycle  -     Ambulatory referral/consult to Gynecology; Future; Expected date: 03/07/2024    6. Anxiety and depression  Assessment & Plan:  Increasing Wellbutrin to 300 mg daily   Encourage patient to continue to follow with counselor at [facility]           Follow up in about 3 months (around 5/29/2024) for anxiety, LFT .    Future Appointments   Date Time Provider Department Center   5/9/2024  1:20 PM Haydee Longoria MD [facility]        Haydee Longoria MD

## 2024-03-01 ENCOUNTER — PATIENT MESSAGE (OUTPATIENT)
Dept: FAMILY MEDICINE | Facility: CLINIC | Age: 24
End: 2024-03-01

## 2024-05-15 ENCOUNTER — HOSPITAL ENCOUNTER (EMERGENCY)
Facility: HOSPITAL | Age: 24
Discharge: PSYCHIATRIC OR SUBSTANCE USE FACILITY | End: 2024-05-15
Attending: STUDENT IN AN ORGANIZED HEALTH CARE EDUCATION/TRAINING PROGRAM
Payer: COMMERCIAL

## 2024-05-15 VITALS
TEMPERATURE: 98 F | HEART RATE: 77 BPM | BODY MASS INDEX: 23.8 KG/M2 | DIASTOLIC BLOOD PRESSURE: 97 MMHG | SYSTOLIC BLOOD PRESSURE: 149 MMHG | HEIGHT: 71 IN | WEIGHT: 170 LBS | OXYGEN SATURATION: 100 % | RESPIRATION RATE: 18 BRPM

## 2024-05-15 DIAGNOSIS — R45.851 SUICIDAL IDEATION: ICD-10-CM

## 2024-05-15 DIAGNOSIS — Z00.8 MEDICAL CLEARANCE FOR PSYCHIATRIC ADMISSION: Primary | ICD-10-CM

## 2024-05-15 LAB
ALBUMIN SERPL-MCNC: 4.1 G/DL (ref 3.5–5)
ALBUMIN/GLOB SERPL: 1.1 RATIO (ref 1.1–2)
ALP SERPL-CCNC: 57 UNIT/L (ref 40–150)
ALT SERPL-CCNC: 9 UNIT/L (ref 0–55)
AMPHET UR QL SCN: NEGATIVE
APAP SERPL-MCNC: <3 UG/ML (ref 10–30)
AST SERPL-CCNC: 21 UNIT/L (ref 5–34)
B-HCG UR QL: NEGATIVE
BACTERIA #/AREA URNS AUTO: ABNORMAL /HPF
BARBITURATE SCN PRESENT UR: NEGATIVE
BASOPHILS # BLD AUTO: 0.05 X10(3)/MCL
BASOPHILS NFR BLD AUTO: 0.4 %
BENZODIAZ UR QL SCN: NEGATIVE
BILIRUB SERPL-MCNC: 0.6 MG/DL
BILIRUB UR QL STRIP.AUTO: NEGATIVE
BUN SERPL-MCNC: 14.2 MG/DL (ref 7–18.7)
CALCIUM SERPL-MCNC: 9 MG/DL (ref 8.4–10.2)
CANNABINOIDS UR QL SCN: POSITIVE
CHLORIDE SERPL-SCNC: 111 MMOL/L (ref 98–107)
CLARITY UR: CLEAR
CO2 SERPL-SCNC: 21 MMOL/L (ref 22–29)
COCAINE UR QL SCN: NEGATIVE
COLOR UR AUTO: ABNORMAL
CREAT SERPL-MCNC: 1.01 MG/DL (ref 0.55–1.02)
EOSINOPHIL # BLD AUTO: 0.05 X10(3)/MCL (ref 0–0.9)
EOSINOPHIL NFR BLD AUTO: 0.4 %
ERYTHROCYTE [DISTWIDTH] IN BLOOD BY AUTOMATED COUNT: 15.4 % (ref 11.5–17)
ETHANOL SERPL-MCNC: <10 MG/DL
FENTANYL UR QL SCN: NEGATIVE
FLUAV AG UPPER RESP QL IA.RAPID: NOT DETECTED
FLUBV AG UPPER RESP QL IA.RAPID: NOT DETECTED
GFR SERPLBLD CREATININE-BSD FMLA CKD-EPI: >60 ML/MIN/1.73/M2
GLOBULIN SER-MCNC: 3.6 GM/DL (ref 2.4–3.5)
GLUCOSE SERPL-MCNC: 92 MG/DL (ref 74–100)
GLUCOSE UR QL STRIP: NORMAL
HCT VFR BLD AUTO: 36.3 % (ref 37–47)
HGB BLD-MCNC: 11.1 G/DL (ref 12–16)
HGB UR QL STRIP: NEGATIVE
IMM GRANULOCYTES # BLD AUTO: 0.05 X10(3)/MCL (ref 0–0.04)
IMM GRANULOCYTES NFR BLD AUTO: 0.4 %
KETONES UR QL STRIP: ABNORMAL
LEUKOCYTE ESTERASE UR QL STRIP: NEGATIVE
LYMPHOCYTES # BLD AUTO: 2.45 X10(3)/MCL (ref 0.6–4.6)
LYMPHOCYTES NFR BLD AUTO: 20 %
MCH RBC QN AUTO: 25.2 PG (ref 27–31)
MCHC RBC AUTO-ENTMCNC: 30.6 G/DL (ref 33–36)
MCV RBC AUTO: 82.5 FL (ref 80–94)
MDMA UR QL SCN: NEGATIVE
MONOCYTES # BLD AUTO: 0.76 X10(3)/MCL (ref 0.1–1.3)
MONOCYTES NFR BLD AUTO: 6.2 %
MUCOUS THREADS URNS QL MICRO: ABNORMAL /LPF
NEUTROPHILS # BLD AUTO: 8.86 X10(3)/MCL (ref 2.1–9.2)
NEUTROPHILS NFR BLD AUTO: 72.6 %
NITRITE UR QL STRIP: NEGATIVE
NRBC BLD AUTO-RTO: 0 %
OPIATES UR QL SCN: NEGATIVE
PCP UR QL: NEGATIVE
PH UR STRIP: 6.5 [PH]
PH UR: 6.5 [PH] (ref 3–11)
PLATELET # BLD AUTO: 337 X10(3)/MCL (ref 130–400)
PMV BLD AUTO: 11.1 FL (ref 7.4–10.4)
POTASSIUM SERPL-SCNC: 3.9 MMOL/L (ref 3.5–5.1)
PROT SERPL-MCNC: 7.7 GM/DL (ref 6.4–8.3)
PROT UR QL STRIP: NEGATIVE
RBC # BLD AUTO: 4.4 X10(6)/MCL (ref 4.2–5.4)
RBC #/AREA URNS AUTO: ABNORMAL /HPF
SARS-COV-2 RNA RESP QL NAA+PROBE: NOT DETECTED
SODIUM SERPL-SCNC: 138 MMOL/L (ref 136–145)
SP GR UR STRIP.AUTO: 1.03 (ref 1–1.03)
SPECIFIC GRAVITY, URINE AUTO (.000) (OHS): 1.03 (ref 1–1.03)
SQUAMOUS #/AREA URNS LPF: ABNORMAL /HPF
TSH SERPL-ACNC: 0.67 UIU/ML (ref 0.35–4.94)
UROBILINOGEN UR STRIP-ACNC: NORMAL
WBC # SPEC AUTO: 12.22 X10(3)/MCL (ref 4.5–11.5)
WBC #/AREA URNS AUTO: ABNORMAL /HPF

## 2024-05-15 PROCEDURE — 0240U COVID/FLU A&B PCR: CPT | Performed by: STUDENT IN AN ORGANIZED HEALTH CARE EDUCATION/TRAINING PROGRAM

## 2024-05-15 PROCEDURE — 85025 COMPLETE CBC W/AUTO DIFF WBC: CPT | Performed by: STUDENT IN AN ORGANIZED HEALTH CARE EDUCATION/TRAINING PROGRAM

## 2024-05-15 PROCEDURE — 99285 EMERGENCY DEPT VISIT HI MDM: CPT

## 2024-05-15 PROCEDURE — 81001 URINALYSIS AUTO W/SCOPE: CPT | Performed by: STUDENT IN AN ORGANIZED HEALTH CARE EDUCATION/TRAINING PROGRAM

## 2024-05-15 PROCEDURE — 80143 DRUG ASSAY ACETAMINOPHEN: CPT | Performed by: STUDENT IN AN ORGANIZED HEALTH CARE EDUCATION/TRAINING PROGRAM

## 2024-05-15 PROCEDURE — 81025 URINE PREGNANCY TEST: CPT | Performed by: STUDENT IN AN ORGANIZED HEALTH CARE EDUCATION/TRAINING PROGRAM

## 2024-05-15 PROCEDURE — 84443 ASSAY THYROID STIM HORMONE: CPT | Performed by: STUDENT IN AN ORGANIZED HEALTH CARE EDUCATION/TRAINING PROGRAM

## 2024-05-15 PROCEDURE — 80053 COMPREHEN METABOLIC PANEL: CPT | Performed by: STUDENT IN AN ORGANIZED HEALTH CARE EDUCATION/TRAINING PROGRAM

## 2024-05-15 PROCEDURE — 80307 DRUG TEST PRSMV CHEM ANLYZR: CPT | Performed by: STUDENT IN AN ORGANIZED HEALTH CARE EDUCATION/TRAINING PROGRAM

## 2024-05-15 PROCEDURE — 82077 ASSAY SPEC XCP UR&BREATH IA: CPT | Performed by: STUDENT IN AN ORGANIZED HEALTH CARE EDUCATION/TRAINING PROGRAM

## 2024-05-15 NOTE — ED NOTES
Pt PEC'd at this time by Dr Park, security notified. Pt changed into paper scrubs and initial safety check performed. All belongings removed from bedside.

## 2024-05-15 NOTE — ED PROVIDER NOTES
"Encounter Date: 5/15/2024    SCRIBE #1 NOTE: I, Samm Carter, am scribing for, and in the presence of,  Panchito Park MD. I have scribed the following portions of the note - Other sections scribed: HPI, ROS, PE.       History     Chief Complaint   Patient presents with    Psychiatric Evaluation     Pt. Arrives via PD, pt. From  after sending an email that states, "Im going to kill myself for the cyberbullying." Pt. Calm and cooperative.      Pt is a 23 y.o. female with a pMHx of psychiatric issues presenting to the ED complaining of suicidal ideation. Pt states that she has been being cyber bullied by people at her university. In response to this, she sent out an email in which she made a threat of suicide. Pt denies SI in the ED.    PEC initiated at 14:55.    The history is provided by the patient. No  was used.     Review of patient's allergies indicates:  No Known Allergies  No past medical history on file.  Past Surgical History:   Procedure Laterality Date    ADENOIDECTOMY      TYMPANOSTOMY TUBE PLACEMENT       Family History   Problem Relation Name Age of Onset    Heart disease Mother           at 54 from heart attack    Hypertension Mother      Early death Brother           from seizure in 20's    Heart disease Maternal Aunt           from HA in 50's     Social History     Tobacco Use    Smoking status: Never    Smokeless tobacco: Never   Substance Use Topics    Alcohol use: Yes    Drug use: Yes     Types: Marijuana     Review of Systems   Constitutional:  Negative for chills and fever.   HENT:  Negative for congestion, drooling and sore throat.    Eyes:  Negative for pain and visual disturbance.   Respiratory:  Negative for chest tightness, shortness of breath and wheezing.    Cardiovascular:  Negative for chest pain, palpitations and leg swelling.   Gastrointestinal:  Negative for abdominal pain, nausea and vomiting.   Genitourinary:  Negative for dysuria and " hematuria.   Musculoskeletal:  Negative for back pain, neck pain and neck stiffness.   Skin:  Negative for pallor and rash.   Neurological:  Negative for weakness and numbness.   Hematological:  Does not bruise/bleed easily.   Psychiatric/Behavioral:  Positive for suicidal ideas.        Physical Exam     Initial Vitals [05/15/24 1445]   BP Pulse Resp Temp SpO2   (!) 169/112 84 18 99.5 °F (37.5 °C) 100 %      MAP       --         Physical Exam    Nursing note and vitals reviewed.  Constitutional: She appears well-developed and well-nourished. She is not diaphoretic. No distress.   No signs of trauma.   HENT:   Head: Normocephalic and atraumatic.   Nose: Nose normal.   Mouth/Throat: Oropharynx is clear and moist.   Eyes: EOM are normal. Pupils are equal, round, and reactive to light.   Neck: Neck supple.   Normal range of motion.  Cardiovascular:  Normal rate and regular rhythm.           No murmur heard.  Pulmonary/Chest: Breath sounds normal. No respiratory distress. She has no wheezes. She has no rales.   Abdominal: Abdomen is soft. She exhibits no distension. There is no abdominal tenderness.   Musculoskeletal:      Cervical back: Normal range of motion and neck supple.     Neurological: She is alert and oriented to person, place, and time. She has normal strength. No cranial nerve deficit or sensory deficit.   Skin: Skin is warm. Capillary refill takes less than 2 seconds. No rash noted.   Psychiatric:   Denies SI currently, no plan of action.         ED Course   Procedures  Labs Reviewed   COMPREHENSIVE METABOLIC PANEL - Abnormal; Notable for the following components:       Result Value    Chloride 111 (*)     CO2 21 (*)     Globulin 3.6 (*)     All other components within normal limits   URINALYSIS, REFLEX TO URINE CULTURE - Abnormal; Notable for the following components:    Ketones, UA Trace (*)     Mucous, UA Trace (*)     All other components within normal limits   DRUG SCREEN, URINE (BEAKER) - Abnormal;  Notable for the following components:    Cannabinoids, Urine Positive (*)     All other components within normal limits    Narrative:     Cut off concentrations:    Amphetamines - 1000 ng/ml  Barbiturates - 200 ng/ml  Benzodiazepine - 200 ng/ml  Cannabinoids (THC) - 50 ng/ml  Cocaine - 300 ng/ml  Fentanyl - 1.0 ng/ml  MDMA - 500 ng/ml  Opiates - 300 ng/ml   Phencyclidine (PCP) - 25 ng/ml    Specimen submitted for drug analysis and tested for pH and specific gravity in order to evaluate sample integrity. Suspect tampering if specific gravity is <1.003 and/or pH is not within the range of 4.5 - 8.0  False negatives may result form substances such as bleach added to urine.  False positives may result for the presence of a substance with similar chemical structure to the drug or its metabolite.    This test provides only a PRELIMINARY analytical test result. A more specific alternate chemical method must be used in order to obtain a confirmed analytical result. Gas chromatography/mass spectrometry (GC/MS) is the preferred confirmatory method. Other chemical confirmation methods are available. Clinical consideration and professional judgement should be applied to any drug of abuse test result, particularly when preliminary positive results are used.    Positive results will be confirmed only at the physicians request. Unconfirmed screening results are to be used only for medical purposes (treatment).        ACETAMINOPHEN LEVEL - Abnormal; Notable for the following components:    Acetaminophen Level <3.0 (*)     All other components within normal limits   CBC WITH DIFFERENTIAL - Abnormal; Notable for the following components:    WBC 12.22 (*)     Hgb 11.1 (*)     Hct 36.3 (*)     MCH 25.2 (*)     MCHC 30.6 (*)     MPV 11.1 (*)     IG# 0.05 (*)     All other components within normal limits   TSH - Normal   ALCOHOL,MEDICAL (ETHANOL) - Normal   COVID/FLU A&B PCR - Normal    Narrative:     The Xpert Xpress SARS-CoV-2/FLU/RSV  plus is a rapid, multiplexed real-time PCR test intended for the simultaneous qualitative detection and differentiation of SARS-CoV-2, Influenza A, Influenza B, and respiratory syncytial virus (RSV) viral RNA in either nasopharyngeal swab or nasal swab specimens.         CBC W/ AUTO DIFFERENTIAL    Narrative:     The following orders were created for panel order CBC auto differential.  Procedure                               Abnormality         Status                     ---------                               -----------         ------                     CBC with Differential[[id number]]       Abnormal            Final result                 Please view results for these tests on the individual orders.          Imaging Results    None          Medications - No data to display  Medical Decision Making  Problems Addressed:  Medical clearance for psychiatric admission: acute illness or injury  Suicidal ideation: acute illness or injury    Amount and/or Complexity of Data Reviewed  Labs: ordered.    Differential diagnosis (includes but is not limited to):   SI, HI, acute psychosis, polysubstance abuse, medication noncompliance    MDM Narrative  23-year-old female presents for suicide ideation while at school.  Patient presents in campus police custody with a printed e-mail that the patient's sent stating that she was going to kill herself due to cyber bullying.  Patient clearly represents a risk for self-harm, pec initiated.  Labs pending for medical clearance.    Update:  Labs reviewed.  Patient is medically cleared for psych transfer.    Dispo: Psych    My independent radiology interpretation:   Point of care US (independently performed and interpreted):   Decision rules/clinical scoring:     Sepsis Perfusion Assessment:     Amount and/or Complexity of Data Reviewed  Independent historian: none   Summary of history:   External data reviewed: notes from previous ED visits  Summary of data reviewed: Prior records  reviewed  Risk and benefits of testing: discussed   Labs: ordered and reviewed      Discussion of management or test interpretation with external provider(s): none   Summary of discussion:     Risk  Diagnosis or treatment significantly impacted by social determinants of health: psychiatric illness  Shared decision making     Critical Care  none    Data Reviewed/Counseling: I have personally reviewed the patient's vital signs, nursing notes, and other relevant tests, information, and imaging. I had a detailed discussion regarding the historical points, exam findings, and any diagnostic results supporting the discharge diagnosis. I personally performed the history, PE, MDM and procedures as documented above and agree with the scribe's documentation.    Portions of this note were dictated using voice recognition software. Although it was reviewed for accuracy, some inherent voice recognition errors may have occurred and may be present in this document.          Scribe Attestation:   Scribe #1: I performed the above scribed service and the documentation accurately describes the services I performed. I attest to the accuracy of the note.    Attending Attestation:           Physician Attestation for Scribe:  Physician Attestation Statement for Scribe #1: I, Panchito Park MD, reviewed documentation, as scribed by Samm Carter in my presence, and it is both accurate and complete.             ED Course as of 05/15/24 1730   Wed May 15, 2024   1457 PEC 1455 []      ED Course User Index  [MC] Panchito Park MD                           Clinical Impression:  Final diagnoses:  [Z00.8] Medical clearance for psychiatric admission (Primary)  [R45.851] Suicidal ideation                 Panchito Park MD  05/15/24 1730

## 2024-05-16 ENCOUNTER — HOSPITAL ENCOUNTER (INPATIENT)
Facility: HOSPITAL | Age: 24
LOS: 4 days | Discharge: HOME | DRG: 885 | End: 2024-05-20
Attending: PSYCHIATRY & NEUROLOGY | Admitting: PSYCHIATRY & NEUROLOGY
Payer: COMMERCIAL

## 2024-05-16 DIAGNOSIS — F32.9 MAJOR DEPRESSION: ICD-10-CM

## 2024-05-16 DIAGNOSIS — F41.9 ANXIETY: ICD-10-CM

## 2024-05-16 LAB
BASOPHILS # BLD AUTO: 0.05 X10(3)/MCL
BASOPHILS NFR BLD AUTO: 0.4 %
CHOLEST SERPL-MCNC: 182 MG/DL
CHOLEST/HDLC SERPL: 2 {RATIO} (ref 0–5)
EOSINOPHIL # BLD AUTO: 0.04 X10(3)/MCL (ref 0–0.9)
EOSINOPHIL NFR BLD AUTO: 0.4 %
ERYTHROCYTE [DISTWIDTH] IN BLOOD BY AUTOMATED COUNT: 15.5 % (ref 11.5–17)
EST. AVERAGE GLUCOSE BLD GHB EST-MCNC: 102.5 MG/DL
GLUCOSE P FAST SERPL-MCNC: 82 MG/DL (ref 70–100)
HBA1C MFR BLD: 5.2 %
HCT VFR BLD AUTO: 38 % (ref 37–47)
HDLC SERPL-MCNC: 83 MG/DL (ref 35–60)
HGB BLD-MCNC: 11.5 G/DL (ref 12–16)
IMM GRANULOCYTES # BLD AUTO: 0.02 X10(3)/MCL (ref 0–0.04)
IMM GRANULOCYTES NFR BLD AUTO: 0.2 %
LDLC SERPL CALC-MCNC: 89 MG/DL (ref 50–140)
LYMPHOCYTES # BLD AUTO: 2.24 X10(3)/MCL (ref 0.6–4.6)
LYMPHOCYTES NFR BLD AUTO: 19.8 %
MCH RBC QN AUTO: 25.3 PG (ref 27–31)
MCHC RBC AUTO-ENTMCNC: 30.3 G/DL (ref 33–36)
MCV RBC AUTO: 83.7 FL (ref 80–94)
MONOCYTES # BLD AUTO: 0.69 X10(3)/MCL (ref 0.1–1.3)
MONOCYTES NFR BLD AUTO: 6.1 %
NEUTROPHILS # BLD AUTO: 8.29 X10(3)/MCL (ref 2.1–9.2)
NEUTROPHILS NFR BLD AUTO: 73.1 %
NRBC BLD AUTO-RTO: 0 %
PLATELET # BLD AUTO: 332 X10(3)/MCL (ref 130–400)
PMV BLD AUTO: 12 FL (ref 7.4–10.4)
RBC # BLD AUTO: 4.54 X10(6)/MCL (ref 4.2–5.4)
T PALLIDUM AB SER QL: NONREACTIVE
TRIGL SERPL-MCNC: 48 MG/DL (ref 37–140)
VLDLC SERPL CALC-MCNC: 10 MG/DL
WBC # SPEC AUTO: 11.33 X10(3)/MCL (ref 4.5–11.5)

## 2024-05-16 PROCEDURE — 25000003 PHARM REV CODE 250

## 2024-05-16 PROCEDURE — 82947 ASSAY GLUCOSE BLOOD QUANT: CPT | Performed by: PSYCHIATRY & NEUROLOGY

## 2024-05-16 PROCEDURE — 83036 HEMOGLOBIN GLYCOSYLATED A1C: CPT | Performed by: PSYCHIATRY & NEUROLOGY

## 2024-05-16 PROCEDURE — 86780 TREPONEMA PALLIDUM: CPT | Performed by: PSYCHIATRY & NEUROLOGY

## 2024-05-16 PROCEDURE — 12400001 HC PSYCH SEMI-PRIVATE ROOM

## 2024-05-16 PROCEDURE — 85025 COMPLETE CBC W/AUTO DIFF WBC: CPT | Performed by: PSYCHIATRY & NEUROLOGY

## 2024-05-16 PROCEDURE — 80061 LIPID PANEL: CPT | Performed by: PSYCHIATRY & NEUROLOGY

## 2024-05-16 PROCEDURE — 36415 COLL VENOUS BLD VENIPUNCTURE: CPT | Performed by: PSYCHIATRY & NEUROLOGY

## 2024-05-16 RX ORDER — MUPIROCIN 20 MG/G
OINTMENT TOPICAL 2 TIMES DAILY
Status: DISCONTINUED | OUTPATIENT
Start: 2024-05-16 | End: 2024-05-17

## 2024-05-16 RX ORDER — BUPROPION HYDROCHLORIDE 150 MG/1
300 TABLET ORAL DAILY
Status: DISCONTINUED | OUTPATIENT
Start: 2024-05-16 | End: 2024-05-20

## 2024-05-16 RX ORDER — HYDROXYZINE HYDROCHLORIDE 50 MG/1
50 TABLET, FILM COATED ORAL EVERY 4 HOURS PRN
Status: DISCONTINUED | OUTPATIENT
Start: 2024-05-16 | End: 2024-05-20 | Stop reason: HOSPADM

## 2024-05-16 RX ORDER — IBUPROFEN 200 MG
1 TABLET ORAL DAILY
Status: DISCONTINUED | OUTPATIENT
Start: 2024-05-16 | End: 2024-05-20 | Stop reason: HOSPADM

## 2024-05-16 RX ORDER — ACETAMINOPHEN 325 MG/1
650 TABLET ORAL EVERY 6 HOURS PRN
Status: DISCONTINUED | OUTPATIENT
Start: 2024-05-16 | End: 2024-05-20 | Stop reason: HOSPADM

## 2024-05-16 RX ORDER — ALUMINUM HYDROXIDE, MAGNESIUM HYDROXIDE, AND SIMETHICONE 1200; 120; 1200 MG/30ML; MG/30ML; MG/30ML
30 SUSPENSION ORAL EVERY 6 HOURS PRN
Status: DISCONTINUED | OUTPATIENT
Start: 2024-05-16 | End: 2024-05-20 | Stop reason: HOSPADM

## 2024-05-16 RX ORDER — TRAZODONE HYDROCHLORIDE 100 MG/1
100 TABLET ORAL NIGHTLY PRN
Status: DISCONTINUED | OUTPATIENT
Start: 2024-05-16 | End: 2024-05-20 | Stop reason: HOSPADM

## 2024-05-16 RX ADMIN — BUPROPION HYDROCHLORIDE 300 MG: 150 TABLET, EXTENDED RELEASE ORAL at 11:05

## 2024-05-16 NOTE — PLAN OF CARE
Problem: Adult Behavioral Health Plan of Care  Goal: Plan of Care Review  Outcome: Progressing  Goal: Patient-Specific Goal (Individualization)  Outcome: Progressing  Goal: Adheres to Safety Considerations for Self and Others  Outcome: Progressing  Goal: Absence of New-Onset Illness or Injury  Outcome: Progressing  Goal: Optimized Coping Skills in Response to Life Stressors  Outcome: Progressing  Goal: Develops/Participates in Therapeutic [place] to Support Successful Transition  Outcome: Progressing  Goal: Rounds/Family Conference  Outcome: Progressing     Problem: Suicide Risk  Goal: Absence of Self-Harm  Outcome: Progressing     Problem: Depressive Signs/Symptoms  Goal: Optimized Energy Level (Depressive Signs/Symptoms)  Outcome: Progressing  Goal: Optimized Cognitive Function (Depressive Signs/Symptoms)  Outcome: Progressing  Goal: Increased Participation and Engagement (Depressive Signs/Symptoms)  Outcome: Progressing  Goal: Enhanced Self-Esteem and Confidence (Depressive Signs/Symptoms)  Outcome: Progressing  Goal: Improved Mood Symptoms (Depressive Signs/Symptoms)  Outcome: Progressing  Goal: Optimized Nutrition Intake (Depressive Signs/Symptoms)  Outcome: Progressing  Goal: Improved Psychomotor Symptoms (Depressive Signs/Symptoms)  Outcome: Progressing  Goal: Improved Sleep (Depressive Signs/Symptoms)  Outcome: Progressing  Goal: Enhanced Social, Occupational or Functional Skills (Depressive Signs/Symptoms)  Outcome: Progressing

## 2024-05-16 NOTE — PROGRESS NOTES
05/16/24 1500   [facility] Group Therapy   Group Name Therapeutic Recreation   Specific Interventions Skilled Activity Creative Expression   Participation Level Supportive   Participation Quality Cooperative   Insight/Motivation Limited   Affect/Mood Display Appropriate   Cognition Alert   Psychomotor WNL

## 2024-05-16 NOTE — ED NOTES
Spoke with Allison at [facility] at this time. PRACHI called at this time

## 2024-05-16 NOTE — NURSING
"Admission Note:    Tabby Livingston is a 23 y.o. female, : 2000, MRN: [id number], admitted on 2024 to [facility] ([facility]) for Nj Harris MD with a diagnosis of Major depression [F32.9]. Patient admitted on a status of Physician Emergency Certificate (PEC). Tabby LEWIS reports no known food or drug allergies.    Patient demonstrated an affect that was flat and anxious. Patient demonstrated mood during assessment that was depressed and anxious. Patient had an appearance that was clean.  Patient denies suicidal ideation. Patient denies suicide plan. Patient denies hallucinations.    Tabby DICKENSs  height is 5' 11" (1.803 m) and weight is 84.1 kg (185 lb 6.4 oz). Her temperature is 97.5 °F (36.4 °C). Her blood pressure is 145/84 (abnormal) and her pulse is 67. Her respiration is 18.     Tabby DICKENSs last BM was noted on: _______    Metal detector screening performed via security personnel. The result of the scan was _______. Head-to-toe physical assessment completed with the following findings:  ________ found upon body screen. A full skin assessment was performed. Tabby Mauricio skin appeared _______.  Tabby LEWIS was oriented to unit, staff, peers, and room. Patient belongings/valuables stored in locked intake room cabinet and changes of clothing provided to patient. Tabby LEWIS was placed on Q 15 min observations.      "
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
## 2024-05-16 NOTE — PLAN OF CARE
Problem: Adult Behavioral Health Plan of Care  Goal: Plan of Care Review  Outcome: Not Progressing  Flowsheets (Taken 5/16/2024 1049)  Patient Agreement with Plan of Care: agrees  Plan of Care Reviewed With: patient  Goal: Patient-Specific Goal (Individualization)  Outcome: Not Progressing  Flowsheets (Taken 5/16/2024 1049)  Patient Personal Strengths: medication/treatment adherence  Patient Vulnerabilities: limited support system  Goal: Adheres to Safety Considerations for Self and Others  Outcome: Not Progressing  Flowsheets (Taken 5/16/2024 1049)  Adheres to Safety Considerations for Self and Others: making progress toward outcome  Intervention: Develop and Maintain Individualized Safety Plan  Flowsheets (Taken 5/16/2024 1049)  Safety Measures: safety rounds completed  Goal: Absence of New-Onset Illness or Injury  Outcome: Not Progressing  Intervention: Identify and Manage Fall Risk  Flowsheets (Taken 5/16/2024 1049)  Safety Measures: safety rounds completed  Intervention: Prevent VTE (Venous Thromboembolism)  Flowsheets (Taken 5/16/2024 1049)  VTE Prevention/Management: fluids promoted  Intervention: Prevent Infection  Flowsheets (Taken 5/16/2024 1049)  Infection Prevention: hand hygiene promoted  Goal: Optimized Coping Skills in Response to Life Stressors  Outcome: Not Progressing  Flowsheets (Taken 5/16/2024 1049)  Optimized Coping Skills in Response to Life Stressors: making progress toward outcome  Intervention: Promote Effective Coping Strategies  Flowsheets (Taken 5/16/2024 1049)  Supportive Measures: active listening utilized  Goal: Develops/Participates in Therapeutic [place] to Support Successful Transition  Outcome: Not Progressing  Flowsheets (Taken 5/16/2024 1049)  Develops/Participates in Therapeutic [place] to Support Successful Transition: making progress toward outcome  Intervention: Foster Therapeutic [place]  Flowsheets (Taken 5/16/2024 1049)  Trust Relationship/Rapport: care  explained  Goal: Rounds/Family Conference  Outcome: Not Progressing  Flowsheets (Taken 5/16/2024 1049)  Participants:   milieu/psych techs   nursing     Problem: Suicide Risk  Goal: Absence of Self-Harm  Outcome: Not Progressing  Intervention: Assess Risk to Self and Maintain Safety  Flowsheets (Taken 5/16/2024 1049)  Behavior Management: behavioral plan reviewed  Self-Harm Prevention: environmental self-harm risks assessed  Intervention: Promote Psychosocial Wellbeing  Flowsheets (Taken 5/16/2024 1049)  Sleep/Rest Enhancement: regular sleep/rest pattern promoted  Supportive Measures: active listening utilized  Family/Support System Care: self-care encouraged  Intervention: Establish Safety Plan and Continuity of Care  Flowsheets (Taken 5/16/2024 1049)  Safe Transition Promotion: access to lethal means addressed     Problem: Depressive Signs/Symptoms  Goal: Optimized Energy Level (Depressive Signs/Symptoms)  Outcome: Not Progressing  Flowsheets (Taken 5/16/2024 1049)  Mutually Determined Action Steps (Optimized Energy Level): grooms self without prompting  Intervention: Optimize Energy Level  Flowsheets (Taken 5/16/2024 1049)  Activity (Behavioral Health): up ad sujey  Patient Performed Hygiene: teeth brushed  Diversional Activity: television  Goal: Optimized Cognitive Function (Depressive Signs/Symptoms)  Outcome: Not Progressing  Flowsheets (Taken 5/16/2024 1049)  Mutually Determined Action Steps (Optimized Cognitive Function): remains focused during activity  Goal: Increased Participation and Engagement (Depressive Signs/Symptoms)  Outcome: Not Progressing  Flowsheets (Taken 5/16/2024 1049)  Mutually Determined Action Steps (Increased Participation and Engagement): participates in one or more activity  Intervention: Facilitate Participation and Engagement  Flowsheets (Taken 5/16/2024 1049)  Supportive Measures: active listening utilized  Diversional Activity: television  Goal: Enhanced Self-Esteem and Confidence  (Depressive Signs/Symptoms)  Outcome: Not Progressing  Flowsheets (Taken 5/16/2024 1049)  Mutually Determined Action Steps (Enhanced Self-Esteem and Confidence): identifies judgmental thoughts  Intervention: Promote Confidence and Self-Esteem  Flowsheets (Taken 5/16/2024 1049)  Supportive Measures: active listening utilized  Goal: Improved Mood Symptoms (Depressive Signs/Symptoms)  Outcome: Not Progressing  Flowsheets (Taken 5/16/2024 1049)  Mutually Determined Action Steps (Improved Mood Symptoms): acknowledges progress  Intervention: Promote Mood Improvement  Flowsheets (Taken 5/16/2024 1049)  Supportive Measures: active listening utilized  Diversional Activity: television  Goal: Optimized Nutrition Intake (Depressive Signs/Symptoms)  Outcome: Not Progressing  Flowsheets (Taken 5/16/2024 1049)  Mutually Determined Action Steps (Optimized Nutrition Intake): eats at meal time  Intervention: Promote Optimal Nutrition Intake  Flowsheets (Taken 5/16/2024 1049)  Nutrition Interventions: food preferences provided  Bowel Elimination Promotion: adequate fluid intake promoted  Goal: Improved Psychomotor Symptoms (Depressive Signs/Symptoms)  Outcome: Not Progressing  Flowsheets (Taken 5/16/2024 1049)  Mutually Determined Action Steps (Improved Psychomotor Symptoms): adheres to medication regimen  Intervention: Manage Psychomotor Movement  Flowsheets (Taken 5/16/2024 1049)  Activity (Behavioral Health): up ad sujey  Patient Performed Hygiene: teeth brushed  Diversional Activity: television  Goal: Improved Sleep (Depressive Signs/Symptoms)  Outcome: Not Progressing  Flowsheets (Taken 5/16/2024 1049)  Mutually Determined Action Steps (Improved Sleep): sleeps 4-6 hours at night  Intervention: Promote Healthy Sleep Hygiene  Flowsheets (Taken 5/16/2024 1049)  Sleep Hygiene Promotion: regular sleep pattern promoted  Goal: Enhanced Social, Occupational or Functional Skills (Depressive Signs/Symptoms)  Outcome: Not  Progressing  Flowsheets (Taken 5/16/2024 1049)  Mutually Determined Action Steps (Enhanced Social, Occupational or Functional Skills): identifies personal strengths  Intervention: Promote Social, Occupational and Functional Ability  Flowsheets (Taken 5/16/2024 1049)  Social Functional Ability Promotion: autonomy promoted     Problem: Excessive Substance Use  Goal: Optimized Energy Level (Excessive Substance Use)  Outcome: Not Progressing  Flowsheets (Taken 5/16/2024 1049)  Mutually Determined Action Steps (Optimized Energy Level): grooms self without prompting  Intervention: Optimize Energy Level  Flowsheets (Taken 5/16/2024 1049)  Activity (Behavioral Health): up ad sujey  Patient Performed Hygiene: teeth brushed  Diversional Activity: television  Goal: Improved Behavioral Control (Excessive Substance Use)  Outcome: Not Progressing  Flowsheets (Taken 5/16/2024 1049)  Mutually Determined Action Steps (Improved Behavioral Control): identifies major stressors  Intervention: Promote Behavior and Impulse Control  Flowsheets (Taken 5/16/2024 1049)  Behavior Management: behavioral plan reviewed  Goal: Increased Participation and Engagement (Excessive Substance Use)  Outcome: Not Progressing  Flowsheets (Taken 5/16/2024 1049)  Mutually Determined Action Steps (Increased Participation and Engagement): identifies support resources  Intervention: Facilitate Participation and Engagement  Flowsheets (Taken 5/16/2024 1049)  Supportive Measures: active listening utilized  Diversional Activity: television  Goal: Improved Physiologic Symptoms (Excessive Substance Use)  Outcome: Not Progressing  Flowsheets (Taken 5/16/2024 1049)  Mutually Determined Action Steps (Improved Physiologic Symptoms): discusses use pattern  Intervention: Optimize Physiologic Function  Flowsheets (Taken 5/16/2024 1049)  Oral Nutrition Promotion: physical activity promoted  Nutrition Interventions: food preferences provided  Goal: Enhanced Social, Occupational  or Functional Skills (Excessive Substance Use)  Outcome: Not Progressing  Flowsheets (Taken 5/16/2024 1049)  Mutually Determined Action Steps (Enhanced Social, Occupational or Functional Skills): identifies personal strengths  Intervention: Promote Social, Occupational and Functional Ability  Flowsheets (Taken 5/16/2024 1049)  Trust Relationship/Rapport: care explained  Social Functional Ability Promotion: autonomy promoted    She is AAO X 4. Voiced depressed mood this AM. Denies SI, HI, hallucinations, and delusions. Isolated and withdrawn, but interacts with selected patients and staff. Fair eye contact noted. Speech normal tone and speed. Continue plan of care and provide a safe and therapeutic environment. Continue to monitor every fifteen minutes for safety.

## 2024-05-16 NOTE — PROGRESS NOTES
"Tabby is a 24 y/o female admitted for Major Depressive Disorder, Recurrent: Moderate (F33.1) and ANXIETY DISORDERS; 7.8.Acute Stress Disorder (F43.9) with a uds +cannabis. CTRS met with Pt 1:1, Tabby was pleasant and cooperative, reporting ability to perform her ADL's. CTRS educated Pt to TR group times and dates, with Tabby agreeing to attend and participate in TR groups. Tabby reported her treatment goal as "Coping skills".       05/16/24 1119   General   Admit Date 05/16/24   Primary Diagnosis Major Depressive Disorder, Recurrent: Moderate (F33.1)   Secondary Diagnosis ANXIETY DISORDERS; 7.8.Acute Stress Disorder (F43.9)   Presybeterian none   Number of Children 0   Children Living? 0   Occupation student   Does the patient have dentures? No   If you were to take part in activities, which of the following would you prefer? Both   Do you feel like you have enough to keep you busy now? Yes   Do you believe that you have the opportunity for physical activity? Yes   Activity Capabilities Maximum   Subjective   Patient states I was really upset and said something I shouldnt have and handled the bullying in a better way   Assessment   Mobility ambulates independently   Transfers independently   Musculoskeletal   (none)   Visual Acuity normal vision   Visual Perception depth perception;color perception;recognizes letters;recognizes numbers   Hearing normal   Speech/Communication normal   Cognitive Concerns oriented x4   Emotional Concerns appears depressed;appears anxious;poor self image   Leisure Interest Survey   Leisure Interest Survey Yes   Social/Group Activities   Parties/Seasonal Program Current Interest   Shopping Current Interest   Volunteering Current Interest   Clubs/Organization Current Interest   Group Discuss Current Interest   Current Events Current Interest   Solitary Activities   Watching TV Current Interest   Computer Activities Current Interest   Watching Videos Current Interest   Music Listening Current Interest "   Physical Activities   Billiards/Pool Current Interest   Fitness/Exercise Programs Current Interest   Golf/Miniature Golf Current Interest   Basketball Current Interest   Weightlifting Current Interest   Creative Activities   Drawing Current Interest   Sewing Would like to learn/do   Creative Writing Current Interest   Playing Musical Instru Current Interest   Cooking Current Interest   Other Creative Activity   (Designing clothes and marketing them)   Outdoor Activities   Picnics/Cookouts Current Interest   Bicycling Current Interest   Horseback Riding Would like to learn/do   Other Outdoor Activity Other (see comment)  (skateboarding and being outdoors)   Spectator Events   Concerts Current Interest   Plays Current Interest   Movies Current Interest   Sporting Events Current Interest   Passive Games   Classic Board Games Current Interest   Card Games Current Interest   Goals   Additional Documentation yes   Goal Formulation With patient   Time For Goal Achievement 7 days   Goal 1 coping skills   Goal 1-Progress ongoing- progressing,   Plan   Planned Therapy Intervention Group Recreational Therapy   Expected Length of Stay 5-7days   PT Frequency Minimum of 3 visits per week

## 2024-05-16 NOTE — PROGRESS NOTES
05/16/24 1000   [facility] Group Therapy   Group Name Therapeutic Recreation   Specific Interventions Skilled Activity Mild Exercises   Participation Level Active;Supportive;Appropriate;Attentive;Sharing   Participation Quality Cooperative;Social   Insight/Motivation Limited   Affect/Mood Display Appropriate;Bright   Cognition Oriented;Alert   Psychomotor WNL
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
## 2024-05-16 NOTE — CARE UPDATE
Transition of care faxed to [facility] and [facility].

## 2024-05-16 NOTE — H&P
[facility] - Behavioral Health Unit  History & Physical    Subjective:      Chief Complaint/Reason for Admission: major depression    Tabby Livingston is a 23 y.o. female. Major depression and situational anxiety     No past medical history on file.  Past Surgical History:   Procedure Laterality Date    ADENOIDECTOMY      TYMPANOSTOMY TUBE PLACEMENT       Family History   Problem Relation Name Age of Onset    Heart disease Mother           at 54 from heart attack    Hypertension Mother      Early death Brother           from seizure in 20's    Heart disease Maternal Aunt           from HA in 50's     Social History     Tobacco Use    Smoking status: Never    Smokeless tobacco: Never   Substance Use Topics    Alcohol use: Yes    Drug use: Yes     Types: Marijuana       PTA Medications   Medication Sig    buPROPion (WELLBUTRIN XL) 300 MG 24 hr tablet Take 1 tablet (300 mg total) by mouth once daily.    ibuprofen (ADVIL,MOTRIN) 800 MG tablet Take 1 tablet (800 mg total) by mouth every 8 (eight) hours as needed for Pain.    valACYclovir (VALTREX) 1000 MG tablet Take 2 tablets (2,000 mg total) by mouth every 12 (twelve) hours. Extend to 10 days if lesions have not healed completely for 12 days (Patient not taking: Reported on 2024)     Review of patient's allergies indicates:  No Known Allergies     Review of Systems   Constitutional: Negative.    HENT: Negative.     Eyes: Negative.    Respiratory: Negative.     Cardiovascular: Negative.    Gastrointestinal: Negative.    Genitourinary: Negative.    Musculoskeletal: Negative.    Skin: Negative.    Neurological: Negative.    Endo/Heme/Allergies: Negative.    Psychiatric/Behavioral:  Positive for depression. Negative for hallucinations, substance abuse and suicidal ideas. The patient is nervous/anxious.        Objective:      Vital Signs (Most Recent)  Temp: 97.5 °F (36.4 °C) (24 0103)  Pulse: 67 (24)  Resp: 18 (24  0103)  BP: (!) 145/84 (05/16/24 0103)    Vital Signs Range (Last 24H):  Temp:  [97.5 °F (36.4 °C)-99.5 °F (37.5 °C)]   Pulse:  [67-84]   Resp:  [18]   BP: (145-169)/()   SpO2:  [100 %]     Physical Exam  HENT:      Head: Normocephalic.      Right Ear: Tympanic membrane normal.      Left Ear: Tympanic membrane normal.      Nose: Nose normal.      Mouth/Throat:      Mouth: Mucous membranes are moist.   Eyes:      Extraocular Movements: Extraocular movements intact.      Pupils: Pupils are equal, round, and reactive to light.   Cardiovascular:      Rate and Rhythm: Normal rate and regular rhythm.   Pulmonary:      Effort: Pulmonary effort is normal.   Abdominal:      General: Abdomen is flat.   Musculoskeletal:         General: Normal range of motion.   Skin:     General: Skin is warm.   Neurological:      General: No focal deficit present.      Mental Status: She is alert and oriented to person, place, and time.      Comments: Vision normal   Hearing normal   EOM intact   Face muscles normal  Facial sensation normal   Shrugs shoulders  Tongue midline            Data Review:    Recent Results (from the past 48 hour(s))   Urinalysis, Reflex to Urine Culture    Collection Time: 05/15/24  3:10 PM    Specimen: Urine   Result Value Ref Range    Color, UA Light-Yellow Yellow, Light-Yellow, Colorless, Straw, Dark-Yellow    Appearance, UA Clear Clear    Specific Gravity, UA 1.027 1.005 - 1.030    pH, UA 6.5 5.0 - 8.5    Protein, UA Negative Negative    Glucose, UA Normal Negative, Normal    Ketones, UA Trace (A) Negative    Blood, UA Negative Negative    Bilirubin, UA Negative Negative    Urobilinogen, UA Normal 0.2, 1.0, Normal    Nitrites, UA Negative Negative    Leukocyte Esterase, UA Negative Negative    WBC, UA 0-5 None Seen, 0-2, 3-5, 0-5 /HPF    Bacteria, UA None Seen None Seen, Trace /HPF    Squamous Epithelial Cells, UA Trace None Seen /HPF    Mucous, UA Trace (A) None Seen /LPF    RBC, UA None Seen None Seen,  0-2, 3-5, 0-5 /HPF   Drug Screen, Urine    Collection Time: 05/15/24  3:10 PM   Result Value Ref Range    Amphetamines, Urine Negative Negative    Barbiturates, Urine Negative Negative    Benzodiazepine, Urine Negative Negative    Cannabinoids, Urine Positive (A) Negative    Cocaine, Urine Negative Negative    Fentanyl, Urine Negative Negative    MDMA, Urine Negative Negative    Opiates, Urine Negative Negative    Phencyclidine, Urine Negative Negative    pH, Urine 6.5 3.0 - 11.0    Specific Gravity, Urine Auto 1.027 1.001 - 1.035   Pregnancy, urine rapid    Collection Time: 05/15/24  3:10 PM   Result Value Ref Range    hCG Qualitative, Urine Negative Negative   COVID/FLU A&B PCR    Collection Time: 05/15/24  3:12 PM   Result Value Ref Range    Influenza A PCR Not Detected Not Detected    Influenza B PCR Not Detected Not Detected    SARS-CoV-2 PCR Not Detected Not Detected, Negative   Comprehensive metabolic panel    Collection Time: 05/15/24  3:16 PM   Result Value Ref Range    Sodium 138 136 - 145 mmol/L    Potassium 3.9 3.5 - 5.1 mmol/L    Chloride 111 (H) 98 - 107 mmol/L    CO2 21 (L) 22 - 29 mmol/L    Glucose 92 74 - 100 mg/dL    Blood Urea Nitrogen 14.2 7.0 - 18.7 mg/dL    Creatinine 1.01 0.55 - 1.02 mg/dL    Calcium 9.0 8.4 - 10.2 mg/dL    Protein Total 7.7 6.4 - 8.3 gm/dL    Albumin 4.1 3.5 - 5.0 g/dL    Globulin 3.6 (H) 2.4 - 3.5 gm/dL    Albumin/Globulin Ratio 1.1 1.1 - 2.0 ratio    Bilirubin Total 0.6 <=1.5 mg/dL    ALP 57 40 - 150 unit/L    ALT 9 0 - 55 unit/L    AST 21 5 - 34 unit/L    eGFR >60 mL/min/1.73/m2   TSH    Collection Time: 05/15/24  3:16 PM   Result Value Ref Range    TSH 0.673 0.350 - 4.940 uIU/mL   Ethanol    Collection Time: 05/15/24  3:16 PM   Result Value Ref Range    Ethanol Level <10.0 <=10.0 mg/dL   Acetaminophen level    Collection Time: 05/15/24  3:16 PM   Result Value Ref Range    Acetaminophen Level <3.0 (L) 10.0 - 30.0 ug/ml   CBC with Differential    Collection Time: 05/15/24   3:16 PM   Result Value Ref Range    WBC 12.22 (H) 4.50 - 11.50 x10(3)/mcL    RBC 4.40 4.20 - 5.40 x10(6)/mcL    Hgb 11.1 (L) 12.0 - 16.0 g/dL    Hct 36.3 (L) 37.0 - 47.0 %    MCV 82.5 80.0 - 94.0 fL    MCH 25.2 (L) 27.0 - 31.0 pg    MCHC 30.6 (L) 33.0 - 36.0 g/dL    RDW 15.4 11.5 - 17.0 %    Platelet 337 130 - 400 x10(3)/mcL    MPV 11.1 (H) 7.4 - 10.4 fL    Neut % 72.6 %    Lymph % 20.0 %    Mono % 6.2 %    Eos % 0.4 %    Basophil % 0.4 %    Lymph # 2.45 0.6 - 4.6 x10(3)/mcL    Neut # 8.86 2.1 - 9.2 x10(3)/mcL    Mono # 0.76 0.1 - 1.3 x10(3)/mcL    Eos # 0.05 0 - 0.9 x10(3)/mcL    Baso # 0.05 <=0.2 x10(3)/mcL    IG# 0.05 (H) 0 - 0.04 x10(3)/mcL    IG% 0.4 %    NRBC% 0.0 %   CBC with Differential    Collection Time: 05/16/24  7:14 AM   Result Value Ref Range    WBC 11.33 4.50 - 11.50 x10(3)/mcL    RBC 4.54 4.20 - 5.40 x10(6)/mcL    Hgb 11.5 (L) 12.0 - 16.0 g/dL    Hct 38.0 37.0 - 47.0 %    MCV 83.7 80.0 - 94.0 fL    MCH 25.3 (L) 27.0 - 31.0 pg    MCHC 30.3 (L) 33.0 - 36.0 g/dL    RDW 15.5 11.5 - 17.0 %    Platelet 332 130 - 400 x10(3)/mcL    MPV 12.0 (H) 7.4 - 10.4 fL    Neut % 73.1 %    Lymph % 19.8 %    Mono % 6.1 %    Eos % 0.4 %    Basophil % 0.4 %    Lymph # 2.24 0.6 - 4.6 x10(3)/mcL    Neut # 8.29 2.1 - 9.2 x10(3)/mcL    Mono # 0.69 0.1 - 1.3 x10(3)/mcL    Eos # 0.04 0 - 0.9 x10(3)/mcL    Baso # 0.05 <=0.2 x10(3)/mcL    IG# 0.02 0 - 0.04 x10(3)/mcL    IG% 0.2 %    NRBC% 0.0 %   Glucose, Fasting    Collection Time: 05/16/24  7:14 AM   Result Value Ref Range    Glucose Fasting 82 70 - 100 mg/dL   Lipid Panel    Collection Time: 05/16/24  7:14 AM   Result Value Ref Range    Cholesterol Total 182 <=200 mg/dL    HDL Cholesterol 83 (H) 35 - 60 mg/dL    Triglyceride 48 37 - 140 mg/dL    Cholesterol/HDL Ratio 2 0 - 5    Very Low Density Lipoprotein 10     LDL Cholesterol 89.00 50.00 - 140.00 mg/dL   SYPHILIS ANTIBODY (WITH REFLEX RPR)    Collection Time: 05/16/24  7:14 AM   Result Value Ref Range    Syphilis Antibody  Nonreactive Nonreactive, Equivocal   Hemoglobin A1C    Collection Time: 05/16/24  7:14 AM   Result Value Ref Range    Hemoglobin A1c 5.2 <=7.0 %    Estimated Average Glucose 102.5 mg/dL        No results found.       Assessment and Plan       Major depression

## 2024-05-17 PROCEDURE — 25000003 PHARM REV CODE 250

## 2024-05-17 PROCEDURE — 12400001 HC PSYCH SEMI-PRIVATE ROOM

## 2024-05-17 RX ORDER — BUPROPION HYDROCHLORIDE 300 MG/1
300 TABLET ORAL DAILY
Qty: 30 TABLET | Refills: 0 | Status: SHIPPED | OUTPATIENT
Start: 2024-05-17 | End: 2024-06-16

## 2024-05-17 RX ADMIN — BUPROPION HYDROCHLORIDE 300 MG: 150 TABLET, EXTENDED RELEASE ORAL at 09:05

## 2024-05-17 NOTE — PROGRESS NOTES
05/17/24 1000   [facility]   Group Name Therapeutic Recreation   Specific Interventions Skilled Activity Mild Exercises   Participation Level Active;Supportive;Appropriate;Attentive;Sharing   Participation Quality Cooperative;Social   Insight/Motivation Improved;Applies New Skills   Affect/Mood Display Appropriate;Bright   Cognition Oriented;Alert   Psychomotor WNL

## 2024-05-17 NOTE — PLAN OF CARE
Problem: Adult Behavioral Health Plan of Care  Goal: Plan of Care Review  Outcome: Progressing  Goal: Patient-Specific Goal (Individualization)  Outcome: Progressing  Goal: Adheres to Safety Considerations for Self and Others  Outcome: Progressing  Goal: Absence of New-Onset Illness or Injury  Outcome: Progressing  Goal: Optimized Coping Skills in Response to Life Stressors  Outcome: Progressing  Goal: Develops/Participates in Therapeutic [place] to Support Successful Transition  Outcome: Progressing  Goal: Rounds/Family Conference  Outcome: Progressing     Problem: Suicide Risk  Goal: Absence of Self-Harm  Outcome: Progressing     Problem: Depressive Signs/Symptoms  Goal: Optimized Energy Level (Depressive Signs/Symptoms)  Outcome: Progressing  Goal: Optimized Cognitive Function (Depressive Signs/Symptoms)  Outcome: Progressing  Goal: Increased Participation and Engagement (Depressive Signs/Symptoms)  Outcome: Progressing  Goal: Enhanced Self-Esteem and Confidence (Depressive Signs/Symptoms)  Outcome: Progressing  Goal: Improved Mood Symptoms (Depressive Signs/Symptoms)  Outcome: Progressing  Goal: Optimized Nutrition Intake (Depressive Signs/Symptoms)  Outcome: Progressing  Goal: Improved Psychomotor Symptoms (Depressive Signs/Symptoms)  Outcome: Progressing  Goal: Improved Sleep (Depressive Signs/Symptoms)  Outcome: Progressing  Goal: Enhanced Social, Occupational or Functional Skills (Depressive Signs/Symptoms)  Outcome: Progressing     Problem: Excessive Substance Use  Goal: Optimized Energy Level (Excessive Substance Use)  Outcome: Progressing  Goal: Improved Behavioral Control (Excessive Substance Use)  Outcome: Progressing  Goal: Increased Participation and Engagement (Excessive Substance Use)  Outcome: Progressing  Goal: Improved Physiologic Symptoms (Excessive Substance Use)  Outcome: Progressing  Goal: Enhanced Social, Occupational or Functional Skills (Excessive Substance Use)  Outcome: Progressing

## 2024-05-17 NOTE — PLAN OF CARE
Problem: Adult Behavioral Health Plan of Care  Goal: Plan of Care Review  Outcome: Progressing  Flowsheets (Taken 5/17/2024 0917)  Patient Agreement with Plan of Care: agrees  Plan of Care Reviewed With: patient  Goal: Patient-Specific Goal (Individualization)  Outcome: Progressing  Flowsheets (Taken 5/17/2024 0917)  Patient Personal Strengths: medication/treatment adherence  Patient Vulnerabilities: limited support system  Goal: Adheres to Safety Considerations for Self and Others  Outcome: Progressing  Flowsheets (Taken 5/17/2024 0917)  Adheres to Safety Considerations for Self and Others: making progress toward outcome  Intervention: Develop and Maintain Individualized Safety Plan  Flowsheets (Taken 5/17/2024 0917)  Safety Measures: safety rounds completed  Goal: Absence of New-Onset Illness or Injury  Outcome: Progressing  Intervention: Identify and Manage Fall Risk  Flowsheets (Taken 5/17/2024 0917)  Safety Measures: safety rounds completed  Intervention: Prevent VTE (Venous Thromboembolism)  Flowsheets (Taken 5/17/2024 0917)  VTE Prevention/Management: fluids promoted  Intervention: Prevent Infection  Flowsheets (Taken 5/17/2024 0917)  Infection Prevention: hand hygiene promoted  Goal: Optimized Coping Skills in Response to Life Stressors  Outcome: Progressing  Flowsheets (Taken 5/17/2024 0917)  Optimized Coping Skills in Response to Life Stressors: making progress toward outcome  Intervention: Promote Effective Coping Strategies  Flowsheets (Taken 5/17/2024 0917)  Supportive Measures: active listening utilized  Goal: Develops/Participates in Therapeutic [place] to Support Successful Transition  Outcome: Progressing  Flowsheets (Taken 5/17/2024 0917)  Develops/Participates in Therapeutic [place] to Support Successful Transition: making progress toward outcome  Intervention: Foster Therapeutic [place]  Flowsheets (Taken 5/17/2024 0917)  Trust Relationship/Rapport: care explained  Goal: Rounds/Family  Conference  Outcome: Progressing  Flowsheets (Taken 5/17/2024 0917)  Participants:   nursing   milieu/psych techs     Problem: Suicide Risk  Goal: Absence of Self-Harm  Outcome: Progressing  Intervention: Assess Risk to Self and Maintain Safety  Flowsheets (Taken 5/17/2024 0917)  Behavior Management: behavioral plan reviewed  Self-Harm Prevention: environmental self-harm risks assessed  Intervention: Promote Psychosocial Wellbeing  Flowsheets (Taken 5/17/2024 0917)  Sleep/Rest Enhancement: regular sleep/rest pattern promoted  Supportive Measures: active listening utilized  Family/Support System Care: self-care encouraged  Intervention: Establish Safety Plan and Continuity of Care  Flowsheets (Taken 5/17/2024 0917)  Safe Transition Promotion: access to lethal means addressed     Problem: Depressive Signs/Symptoms  Goal: Optimized Energy Level (Depressive Signs/Symptoms)  Outcome: Progressing  Flowsheets (Taken 5/17/2024 0917)  Mutually Determined Action Steps (Optimized Energy Level): grooms self without prompting  Intervention: Optimize Energy Level  Flowsheets (Taken 5/17/2024 0917)  Activity (Behavioral Health): up ad sujey  Patient Performed Hygiene: teeth brushed  Diversional Activity: television  Goal: Optimized Cognitive Function (Depressive Signs/Symptoms)  Outcome: Progressing  Flowsheets (Taken 5/17/2024 0917)  Mutually Determined Action Steps (Optimized Cognitive Function): remains focused during activity  Goal: Increased Participation and Engagement (Depressive Signs/Symptoms)  Outcome: Progressing  Flowsheets (Taken 5/17/2024 0917)  Mutually Determined Action Steps (Increased Participation and Engagement): participates in one or more activity  Intervention: Facilitate Participation and Engagement  Flowsheets (Taken 5/17/2024 0917)  Supportive Measures: active listening utilized  Diversional Activity: television  Goal: Enhanced Self-Esteem and Confidence (Depressive Signs/Symptoms)  Outcome:  Progressing  Flowsheets (Taken 5/17/2024 0917)  Mutually Determined Action Steps (Enhanced Self-Esteem and Confidence): identifies judgmental thoughts  Intervention: Promote Confidence and Self-Esteem  Flowsheets (Taken 5/17/2024 0917)  Supportive Measures: active listening utilized  Goal: Improved Mood Symptoms (Depressive Signs/Symptoms)  Outcome: Progressing  Flowsheets (Taken 5/17/2024 0917)  Mutually Determined Action Steps (Improved Mood Symptoms): acknowledges progress  Intervention: Promote Mood Improvement  Flowsheets (Taken 5/17/2024 0917)  Supportive Measures: active listening utilized  Diversional Activity: television  Goal: Optimized Nutrition Intake (Depressive Signs/Symptoms)  Outcome: Progressing  Flowsheets (Taken 5/17/2024 0917)  Mutually Determined Action Steps (Optimized Nutrition Intake): eats at meal time  Intervention: Promote Optimal Nutrition Intake  Flowsheets (Taken 5/17/2024 0917)  Nutrition Interventions: food preferences provided  Bowel Elimination Promotion: adequate fluid intake promoted  Goal: Improved Psychomotor Symptoms (Depressive Signs/Symptoms)  Outcome: Progressing  Flowsheets (Taken 5/17/2024 0917)  Mutually Determined Action Steps (Improved Psychomotor Symptoms): adheres to medication regimen  Intervention: Manage Psychomotor Movement  Flowsheets (Taken 5/17/2024 0917)  Activity (Behavioral Health): up ad sujey  Patient Performed Hygiene: teeth brushed  Diversional Activity: television  Goal: Improved Sleep (Depressive Signs/Symptoms)  Outcome: Progressing  Flowsheets (Taken 5/17/2024 0917)  Mutually Determined Action Steps (Improved Sleep): sleeps 4-6 hours at night  Intervention: Promote Healthy Sleep Hygiene  Flowsheets (Taken 5/17/2024 0917)  Sleep Hygiene Promotion: regular sleep pattern promoted  Goal: Enhanced Social, Occupational or Functional Skills (Depressive Signs/Symptoms)  Outcome: Progressing  Flowsheets (Taken 5/17/2024 0917)  Mutually Determined Action Steps  (Enhanced Social, Occupational or Functional Skills): identifies personal strengths  Intervention: Promote Social, Occupational and Functional Ability  Flowsheets (Taken 5/17/2024 0917)  Social Functional Ability Promotion: autonomy promoted     Problem: Excessive Substance Use  Goal: Optimized Energy Level (Excessive Substance Use)  Outcome: Progressing  Flowsheets (Taken 5/17/2024 0917)  Mutually Determined Action Steps (Optimized Energy Level): grooms self without prompting  Intervention: Optimize Energy Level  Flowsheets (Taken 5/17/2024 0917)  Activity (Behavioral Health): up ad usjey  Patient Performed Hygiene: teeth brushed  Diversional Activity: television  Goal: Improved Behavioral Control (Excessive Substance Use)  Outcome: Progressing  Flowsheets (Taken 5/17/2024 0917)  Mutually Determined Action Steps (Improved Behavioral Control): identifies major stressors  Intervention: Promote Behavior and Impulse Control  Flowsheets (Taken 5/17/2024 0917)  Behavior Management: behavioral plan reviewed  Goal: Increased Participation and Engagement (Excessive Substance Use)  Outcome: Progressing  Flowsheets (Taken 5/17/2024 0917)  Mutually Determined Action Steps (Increased Participation and Engagement): identifies support resources  Intervention: Facilitate Participation and Engagement  Flowsheets (Taken 5/17/2024 0917)  Supportive Measures: active listening utilized  Diversional Activity: television  Goal: Improved Physiologic Symptoms (Excessive Substance Use)  Outcome: Progressing  Flowsheets (Taken 5/17/2024 0917)  Mutually Determined Action Steps (Improved Physiologic Symptoms): discusses use pattern  Intervention: Optimize Physiologic Function  Flowsheets (Taken 5/17/2024 0917)  Oral Nutrition Promotion: rest periods promoted  Nutrition Interventions: food preferences provided  Goal: Enhanced Social, Occupational or Functional Skills (Excessive Substance Use)  Outcome: Progressing  Flowsheets (Taken 5/17/2024  4404)  Mutually Determined Action Steps (Enhanced Social, Occupational or Functional Skills): identifies personal strengths  Intervention: Promote Social, Occupational and Functional Ability  Flowsheets (Taken 5/17/2024 9886)  Trust Relationship/Rapport: care explained  Social Functional Ability Promotion: autonomy promoted    She is AAO X 4. Flat affect and depressed mood. Anxious. Isolated and withdrawn, but interacts with selected patients and staff. Good eye contact noted. Speech normal tone and speed. Minimal self disclosure offered. Continue plan of care and provide a safe and therapeutic environment. Continue to monitor every fifteen minutes for safety.
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
## 2024-05-17 NOTE — NURSING
"Daily Nursing Note:      Behavior:    Patient (Tabby Livingston is a 23 y.o. female, : 2000, MRN: [id number]) demonstrating an affect that was flat. Tabby LEWIS demonstrating mood that is anxious. Tabby LEWIS had an appearance that was clean. Tabby LEWIS denies suicidal ideation. Tabby LEWIS denies suicide plan. Tabby LEWIS denies homicidal ideation. Tabby LEWIS denies hallucinations.    Tabby LEWIS's  height is 5' 11" (1.803 m) and weight is 84.1 kg (185 lb 6.4 oz). Her temperature is 97.5 °F (36.4 °C). Her blood pressure is 145/84 (abnormal) and her pulse is 67. Her respiration is 18.     Tabby DICKENSs last BM was noted on: _______      Intervention:    Encourage Tabby LEWIS to perform self-hygiene, grooming, and changing of clothing. Monitor Tabby DICKENSs behavior and program compliance. Monitor Tabby LEWIS for suicidal ideation, homicidal ideation, sleep disturbance, and hallucinations. Encourage Tabby LEWIS to eat all portions of meals and assess for meal preferences. Monitor Tabby LEWIS for intake and output to ensure hydration. Notify the Physician/Physician Assistant/Advance Practice Registered Nurse (MD/PA/APRN) for any medication refusal and any change in patient condition.      Response:    Tabby LEWIS verbalizes understand of unit process and procedures. Tabby LEWIS reported medications ______.      Plan:     Continue to monitor per MD/PA/APRN orders; maintain patient safety.    "

## 2024-05-17 NOTE — PROGRESS NOTES
05/17/24 [address] [place] [address] Group Therapy   Group Name Therapeutic Recreation   Specific Interventions Skilled Activity Leisure Education and Awareness   Participation Level Active;Supportive;Appropriate;Attentive;Sharing   Participation Quality Cooperative;Social   Insight/Motivation Improved;Applies New Skills   Affect/Mood Display Appropriate;Bright   Cognition Oriented;Alert   Psychomotor WNL

## 2024-05-18 PROCEDURE — 12400001 HC PSYCH SEMI-PRIVATE ROOM

## 2024-05-18 PROCEDURE — 25000003 PHARM REV CODE 250

## 2024-05-18 RX ADMIN — BUPROPION HYDROCHLORIDE 300 MG: 150 TABLET, EXTENDED RELEASE ORAL at 08:05

## 2024-05-18 NOTE — PLAN OF CARE
Problem: Adult Behavioral Health Plan of Care  Goal: Plan of Care Review  Outcome: Progressing  Flowsheets (Taken 5/18/2024 0306)  Patient Agreement with Plan of Care: agrees  Plan of Care Reviewed With: patient  Goal: Patient-Specific Goal (Individualization)  Outcome: Progressing  Flowsheets (Taken 5/18/2024 0306)  Patient Personal Strengths:   family/social support   positive educational history  Patient Vulnerabilities: limited support system  Goal: Adheres to Safety Considerations for Self and Others  Outcome: Progressing  Flowsheets (Taken 5/18/2024 0306)  Adheres to Safety Considerations for Self and Others: making progress toward outcome  Intervention: Develop and Maintain Individualized Safety Plan  Flowsheets (Taken 5/18/2024 0306)  Safety Measures:   safety rounds completed   suicide assessment completed  Goal: Absence of New-Onset Illness or Injury  Outcome: Progressing  Intervention: Identify and Manage Fall Risk  Flowsheets (Taken 5/18/2024 0306)  Safety Measures:   safety rounds completed   suicide assessment completed  Intervention: Prevent VTE (Venous Thromboembolism)  Flowsheets (Taken 5/18/2024 0306)  VTE Prevention/Management:   fluids promoted   ambulation promoted  Intervention: Prevent Infection  Flowsheets (Taken 5/18/2024 0306)  Infection Prevention:   hand hygiene promoted   rest/sleep promoted  Goal: Optimized Coping Skills in Response to Life Stressors  Outcome: Progressing  Flowsheets (Taken 5/18/2024 0306)  Optimized Coping Skills in Response to Life Stressors: making progress toward outcome  Intervention: Promote Effective Coping Strategies  Flowsheets (Taken 5/18/2024 0306)  Supportive Measures:   active listening utilized   verbalization of feelings encouraged  Goal: Develops/Participates in Therapeutic [place] to Support Successful Transition  Outcome: Progressing  Flowsheets (Taken 5/18/2024 0306)  Develops/Participates in Therapeutic [place] to Support Successful Transition:  making progress toward outcome  Intervention: Foster Therapeutic [place]  Flowsheets (Taken 5/18/2024 0306)  Trust Relationship/Rapport:   care explained   emotional support provided   questions encouraged   questions answered  Intervention: Mutually Develop Transition Plan  Flowsheets (Taken 5/18/2024 0306)  Current Discharge Risk: psychiatric illness  Outpatient/Agency/Support Group Needs:   outpatient psychiatric care (specify)   outpatient medication management  Anticipated Discharge Disposition: home or self-care  Patient/Family Anticipated Services at Transition: mental health services  Concerns to be Addressed:   medication   mental health  Goal: Rounds/Family Conference  Outcome: Progressing  Flowsheets (Taken 5/18/2024 0306)  Participants:   milieu/psych techs   nursing     Problem: Suicide Risk  Goal: Absence of Self-Harm  Outcome: Progressing  Intervention: Promote Psychosocial Wellbeing  Flowsheets (Taken 5/18/2024 0306)  Supportive Measures:   active listening utilized   verbalization of feelings encouraged     Problem: Depressive Signs/Symptoms  Goal: Optimized Energy Level (Depressive Signs/Symptoms)  Outcome: Progressing  Flowsheets (Taken 5/18/2024 0306)  Mutually Determined Action Steps (Optimized Energy Level): grooms self without prompting  Intervention: Optimize Energy Level  Flowsheets (Taken 5/18/2024 0306)  Activity (Behavioral Health): up ad sujey  Goal: Optimized Cognitive Function (Depressive Signs/Symptoms)  Outcome: Progressing  Goal: Increased Participation and Engagement (Depressive Signs/Symptoms)  Outcome: Progressing  Intervention: Facilitate Participation and Engagement  Flowsheets (Taken 5/18/2024 0306)  Supportive Measures:   active listening utilized   verbalization of feelings encouraged  Goal: Enhanced Self-Esteem and Confidence (Depressive Signs/Symptoms)  Outcome: Progressing  Intervention: Promote Confidence and Self-Esteem  Flowsheets (Taken 5/18/2024 0306)  Supportive  Measures:   active listening utilized   verbalization of feelings encouraged  Goal: Improved Mood Symptoms (Depressive Signs/Symptoms)  Outcome: Progressing  Flowsheets (Taken 5/18/2024 0306)  Mutually Determined Action Steps (Improved Mood Symptoms): acknowledges progress  Intervention: Promote Mood Improvement  Flowsheets (Taken 5/18/2024 0306)  Supportive Measures:   active listening utilized   verbalization of feelings encouraged  Goal: Optimized Nutrition Intake (Depressive Signs/Symptoms)  Outcome: Progressing  Flowsheets (Taken 5/18/2024 0306)  Mutually Determined Action Steps (Optimized Nutrition Intake): eats at meal time  Intervention: Promote Optimal Nutrition Intake  Flowsheets (Taken 5/18/2024 0306)  Nutrition Interventions: food preferences provided  Bowel Elimination Promotion:   adequate fluid intake promoted   ambulation promoted  Goal: Improved Psychomotor Symptoms (Depressive Signs/Symptoms)  Outcome: Progressing  Flowsheets (Taken 5/18/2024 0306)  Mutually Determined Action Steps (Improved Psychomotor Symptoms): adheres to medication regimen  Intervention: Manage Psychomotor Movement  Flowsheets (Taken 5/18/2024 0306)  Activity (Behavioral Health): up ad sujey  Goal: Improved Sleep (Depressive Signs/Symptoms)  Outcome: Progressing  Flowsheets (Taken 5/18/2024 0306)  Mutually Determined Action Steps (Improved Sleep): sleeps 4-6 hours at night  Intervention: Promote Healthy Sleep Hygiene  Flowsheets (Taken 5/18/2024 0306)  Sleep Hygiene Promotion:   awakenings minimized   regular sleep pattern promoted   noise level reduced   room lighting adjusted  Goal: Enhanced Social, Occupational or Functional Skills (Depressive Signs/Symptoms)  Outcome: Progressing  Intervention: Promote Social, Occupational and Functional Ability  Flowsheets (Taken 5/18/2024 0306)  Social Functional Ability Promotion: autonomy promoted     Problem: Excessive Substance Use  Goal: Optimized Energy Level (Excessive Substance  Use)  Outcome: Progressing  Flowsheets (Taken 5/18/2024 0306)  Mutually Determined Action Steps (Optimized Energy Level): grooms self without prompting  Intervention: Optimize Energy Level  Flowsheets (Taken 5/18/2024 0306)  Activity (Behavioral Health): up ad sujey  Goal: Improved Behavioral Control (Excessive Substance Use)  Outcome: Progressing  Goal: Increased Participation and Engagement (Excessive Substance Use)  Outcome: Progressing  Flowsheets (Taken 5/18/2024 0306)  Mutually Determined Action Steps (Increased Participation and Engagement): identifies support resources  Intervention: Facilitate Participation and Engagement  Flowsheets (Taken 5/18/2024 0306)  Supportive Measures:   active listening utilized   verbalization of feelings encouraged  Goal: Improved Physiologic Symptoms (Excessive Substance Use)  Outcome: Progressing  Intervention: Optimize Physiologic Function  Flowsheets (Taken 5/18/2024 0306)  Oral Nutrition Promotion: social interaction promoted  Nutrition Interventions: food preferences provided  Goal: Enhanced Social, Occupational or Functional Skills (Excessive Substance Use)  Outcome: Progressing  Intervention: Promote Social, Occupational and Functional Ability  Flowsheets (Taken 5/18/2024 0306)  Trust Relationship/Rapport:   care explained   emotional support provided   questions encouraged   questions answered  Social Functional Ability Promotion: autonomy promoted   AAOx4. Calm and cooperative. Flat affect. Denies suicidal ideations and hallucinations. Compliant with medications. No PRN's required or requested. Reports that she is eating and sleeping well. Clean appearance. Eye contact hesitant to make. Interacting well with staff and peers. No agitation or aggression noted. Continue with plan of care and q15 minute safety checks.

## 2024-05-18 NOTE — PLAN OF CARE
Problem: Adult Behavioral Health Plan of Care  Goal: Plan of Care Review  Outcome: Progressing  Flowsheets (Taken 5/18/2024 1010)  Patient Agreement with Plan of Care: agrees  Plan of Care Reviewed With: patient  Goal: Patient-Specific Goal (Individualization)  Outcome: Progressing  Flowsheets (Taken 5/18/2024 1010)  Patient Personal Strengths: independent living skills  Patient Vulnerabilities: limited support system  Goal: Adheres to Safety Considerations for Self and Others  Outcome: Progressing  Flowsheets (Taken 5/18/2024 1010)  Adheres to Safety Considerations for Self and Others: making progress toward outcome  Intervention: Develop and Maintain Individualized Safety Plan  Flowsheets (Taken 5/18/2024 1010)  Safety Measures: safety rounds completed  Goal: Absence of New-Onset Illness or Injury  Outcome: Progressing  Intervention: Identify and Manage Fall Risk  Flowsheets (Taken 5/18/2024 1010)  Safety Measures: safety rounds completed  Intervention: Prevent VTE (Venous Thromboembolism)  Flowsheets (Taken 5/18/2024 1010)  VTE Prevention/Management: ambulation promoted  Intervention: Prevent Infection  Flowsheets (Taken 5/18/2024 1010)  Infection Prevention: hand hygiene promoted  Goal: Optimized Coping Skills in Response to Life Stressors  Outcome: Progressing  Flowsheets (Taken 5/18/2024 1010)  Optimized Coping Skills in Response to Life Stressors: making progress toward outcome  Intervention: Promote Effective Coping Strategies  Flowsheets (Taken 5/18/2024 1010)  Supportive Measures: active listening utilized  Goal: Develops/Participates in Therapeutic [place] to Support Successful Transition  Outcome: Progressing  Flowsheets (Taken 5/18/2024 1010)  Develops/Participates in Therapeutic [place] to Support Successful Transition: making progress toward outcome  Intervention: Foster Therapeutic [place]  Flowsheets (Taken 5/18/2024 1010)  Trust Relationship/Rapport: care explained  Goal: Rounds/Family  Conference  Outcome: Progressing  Flowsheets (Taken 5/18/2024 1010)  Participants:   milieu/psych techs   nursing     Problem: Suicide Risk  Goal: Absence of Self-Harm  Outcome: Progressing  Intervention: Assess Risk to Self and Maintain Safety  Flowsheets (Taken 5/18/2024 1010)  Behavior Management: behavioral plan reviewed  Self-Harm Prevention: environmental self-harm risks assessed  Intervention: Promote Psychosocial Wellbeing  Flowsheets (Taken 5/18/2024 1010)  Sleep/Rest Enhancement: regular sleep/rest pattern promoted  Supportive Measures: active listening utilized  Family/Support System Care: self-care encouraged  Intervention: Establish Safety Plan and Continuity of Care  Flowsheets (Taken 5/18/2024 1010)  Safe Transition Promotion: access to lethal means addressed     Problem: Depressive Signs/Symptoms  Goal: Optimized Energy Level (Depressive Signs/Symptoms)  Outcome: Progressing  Flowsheets (Taken 5/18/2024 1010)  Mutually Determined Action Steps (Optimized Energy Level): grooms self without prompting  Intervention: Optimize Energy Level  Flowsheets (Taken 5/18/2024 1010)  Activity (Behavioral Health): up ad sujey  Patient Performed Hygiene: teeth brushed  Diversional Activity: television  Goal: Optimized Cognitive Function (Depressive Signs/Symptoms)  Outcome: Progressing  Flowsheets (Taken 5/18/2024 1010)  Mutually Determined Action Steps (Optimized Cognitive Function): remains focused during activity  Goal: Increased Participation and Engagement (Depressive Signs/Symptoms)  Outcome: Progressing  Flowsheets (Taken 5/18/2024 1010)  Mutually Determined Action Steps (Increased Participation and Engagement): participates in one or more activity  Intervention: Facilitate Participation and Engagement  Flowsheets (Taken 5/18/2024 1010)  Supportive Measures: active listening utilized  Diversional Activity: television  Goal: Enhanced Self-Esteem and Confidence (Depressive Signs/Symptoms)  Outcome:  Progressing  Flowsheets (Taken 5/18/2024 1010)  Mutually Determined Action Steps (Enhanced Self-Esteem and Confidence): identifies judgmental thoughts  Intervention: Promote Confidence and Self-Esteem  Flowsheets (Taken 5/18/2024 1010)  Supportive Measures: active listening utilized  Goal: Improved Mood Symptoms (Depressive Signs/Symptoms)  Outcome: Progressing  Flowsheets (Taken 5/18/2024 1010)  Mutually Determined Action Steps (Improved Mood Symptoms): acknowledges progress  Intervention: Promote Mood Improvement  Flowsheets (Taken 5/18/2024 1010)  Supportive Measures: active listening utilized  Diversional Activity: television  Goal: Optimized Nutrition Intake (Depressive Signs/Symptoms)  Outcome: Progressing  Flowsheets (Taken 5/18/2024 1010)  Mutually Determined Action Steps (Optimized Nutrition Intake): eats at meal time  Intervention: Promote Optimal Nutrition Intake  Flowsheets (Taken 5/18/2024 1010)  Nutrition Interventions: food preferences provided  Bowel Elimination Promotion: adequate fluid intake promoted  Goal: Improved Psychomotor Symptoms (Depressive Signs/Symptoms)  Outcome: Progressing  Flowsheets (Taken 5/18/2024 1010)  Mutually Determined Action Steps (Improved Psychomotor Symptoms): adheres to medication regimen  Intervention: Manage Psychomotor Movement  Flowsheets (Taken 5/18/2024 1010)  Activity (Behavioral Health): up ad sujey  Patient Performed Hygiene: teeth brushed  Diversional Activity: television  Goal: Improved Sleep (Depressive Signs/Symptoms)  Outcome: Progressing  Flowsheets (Taken 5/18/2024 1010)  Mutually Determined Action Steps (Improved Sleep): sleeps 4-6 hours at night  Intervention: Promote Healthy Sleep Hygiene  Flowsheets (Taken 5/18/2024 1010)  Sleep Hygiene Promotion: regular sleep pattern promoted  Goal: Enhanced Social, Occupational or Functional Skills (Depressive Signs/Symptoms)  Outcome: Progressing  Flowsheets (Taken 5/18/2024 1010)  Mutually Determined Action Steps  (Enhanced Social, Occupational or Functional Skills): identifies personal strengths  Intervention: Promote Social, Occupational and Functional Ability  Flowsheets (Taken 5/18/2024 1010)  Social Functional Ability Promotion: autonomy promoted     Problem: Excessive Substance Use  Goal: Optimized Energy Level (Excessive Substance Use)  Outcome: Progressing  Flowsheets (Taken 5/18/2024 1010)  Mutually Determined Action Steps (Optimized Energy Level): grooms self without prompting  Intervention: Optimize Energy Level  Flowsheets (Taken 5/18/2024 1010)  Activity (Behavioral Health): up ad sujey  Patient Performed Hygiene: teeth brushed  Diversional Activity: television  Goal: Improved Behavioral Control (Excessive Substance Use)  Outcome: Progressing  Flowsheets (Taken 5/18/2024 1010)  Mutually Determined Action Steps (Improved Behavioral Control): identifies major stressors  Intervention: Promote Behavior and Impulse Control  Flowsheets (Taken 5/18/2024 1010)  Behavior Management: behavioral plan reviewed  Goal: Increased Participation and Engagement (Excessive Substance Use)  Outcome: Progressing  Flowsheets (Taken 5/18/2024 1010)  Mutually Determined Action Steps (Increased Participation and Engagement): identifies support resources  Intervention: Facilitate Participation and Engagement  Flowsheets (Taken 5/18/2024 1010)  Supportive Measures: active listening utilized  Diversional Activity: television  Goal: Improved Physiologic Symptoms (Excessive Substance Use)  Outcome: Progressing  Flowsheets (Taken 5/18/2024 1010)  Mutually Determined Action Steps (Improved Physiologic Symptoms): discusses use pattern  Intervention: Optimize Physiologic Function  Flowsheets (Taken 5/18/2024 1010)  Oral Nutrition Promotion: social interaction promoted  Nutrition Interventions: food preferences provided  Goal: Enhanced Social, Occupational or Functional Skills (Excessive Substance Use)  Outcome: Progressing  Flowsheets (Taken  5/18/2024 1010)  Mutually Determined Action Steps (Enhanced Social, Occupational or Functional Skills): identifies personal strengths  Intervention: Promote Social, Occupational and Functional Ability  Flowsheets (Taken 5/18/2024 1010)  Trust Relationship/Rapport: care explained  Social Functional Ability Promotion: autonomy promoted    She is AAO X 4. Flat affect and blunted mood. Anxious. Denies SI, HI, hallucinations, and delusions. States she is ready for discharge on Monday. Interacts with selected patients and staff. Good eye contact noted. Speech normal tone and speed. Continue plan of care and provide a safe and therapeutic environment. Continue to monitor every fifteen minutes for safety.

## 2024-05-18 NOTE — PROGRESS NOTES
05/18/24 1345   [facility] Group Therapy   Group Name Mental Awareness   Specific Interventions Relapse Prevention   Participation Level Attentive   Participation Quality Cooperative   Insight/Motivation Good   Affect/Mood Display Appropriate   Cognition Alert   Psychomotor WNL        [NL] : normotonic [de-identified] : cafe au lait spots,  multiple insect bites with swelling and erythema

## 2024-05-19 VITALS
RESPIRATION RATE: 18 BRPM | TEMPERATURE: 98 F | SYSTOLIC BLOOD PRESSURE: 128 MMHG | BODY MASS INDEX: 25.95 KG/M2 | OXYGEN SATURATION: 98 % | WEIGHT: 185.38 LBS | HEIGHT: 71 IN | DIASTOLIC BLOOD PRESSURE: 84 MMHG | HEART RATE: 73 BPM

## 2024-05-19 PROCEDURE — 12400001 HC PSYCH SEMI-PRIVATE ROOM

## 2024-05-19 PROCEDURE — 25000003 PHARM REV CODE 250

## 2024-05-19 PROCEDURE — 25000003 PHARM REV CODE 250: Performed by: PSYCHIATRY & NEUROLOGY

## 2024-05-19 RX ADMIN — BUPROPION HYDROCHLORIDE 300 MG: 150 TABLET, EXTENDED RELEASE ORAL at 08:05

## 2024-05-19 RX ADMIN — TRAZODONE HYDROCHLORIDE 100 MG: 100 TABLET ORAL at 01:05

## 2024-05-19 NOTE — PROGRESS NOTES
05/19/24 1420   [facility] Group Therapy   Group Name Community Reintegration   Specific Interventions Remotivation   Participation Level None   Participation Quality Sleeping   Insight/Motivation None;Other (see comments)  (didn't attend)

## 2024-05-19 NOTE — NURSING
PRN Administration @ 0145    Patient approached nurses' station asking for something to help sleep. Stated she had woken and could not return to sleep.  mg Trazodone PO administered as order. Reminded patient we cannot give PRN sleeping medication after 0200 and encouraged patient to ask for sleep aids earlier in the night is she feels difficulty sleeping. Pt verbalized agreement. Pt returned to room. Will assess efficacy of PRN in 30 minutes.

## 2024-05-19 NOTE — NURSING
PRN Administration Follow-Up:    Patient lying in bed, lights off, resting comfortably. Reports medication was moderately effective at inducing sleep. Will continue to monitor for q15 min safety checks per orders.

## 2024-05-19 NOTE — PLAN OF CARE
Problem: Adult Behavioral Health Plan of Care  Goal: Plan of Care Review  Outcome: Progressing  Goal: Patient-Specific Goal (Individualization)  Outcome: Progressing  Goal: Adheres to Safety Considerations for Self and Others  Outcome: Progressing  Goal: Absence of New-Onset Illness or Injury  Outcome: Progressing  Goal: Optimized Coping Skills in Response to Life Stressors  Outcome: Progressing  Goal: Develops/Participates in Therapeutic [place] to Support Successful Transition  Outcome: Progressing  Goal: Rounds/Family Conference  Outcome: Progressing     Problem: Suicide Risk  Goal: Absence of Self-Harm  Outcome: Progressing     Problem: Depressive Signs/Symptoms  Goal: Optimized Energy Level (Depressive Signs/Symptoms)  Outcome: Progressing  Goal: Optimized Cognitive Function (Depressive Signs/Symptoms)  Outcome: Progressing  Goal: Increased Participation and Engagement (Depressive Signs/Symptoms)  Outcome: Progressing  Goal: Enhanced Self-Esteem and Confidence (Depressive Signs/Symptoms)  Outcome: Progressing  Goal: Improved Mood Symptoms (Depressive Signs/Symptoms)  Outcome: Progressing  Goal: Optimized Nutrition Intake (Depressive Signs/Symptoms)  Outcome: Progressing  Goal: Improved Psychomotor Symptoms (Depressive Signs/Symptoms)  Outcome: Progressing  Goal: Improved Sleep (Depressive Signs/Symptoms)  Outcome: Progressing  Goal: Enhanced Social, Occupational or Functional Skills (Depressive Signs/Symptoms)  Outcome: Progressing     Problem: Excessive Substance Use  Goal: Optimized Energy Level (Excessive Substance Use)  Outcome: Progressing  Goal: Improved Behavioral Control (Excessive Substance Use)  Outcome: Progressing  Goal: Increased Participation and Engagement (Excessive Substance Use)  Outcome: Progressing  Goal: Improved Physiologic Symptoms (Excessive Substance Use)  Outcome: Progressing  Goal: Enhanced Social, Occupational or Functional Skills (Excessive Substance Use)  Outcome:  Progressing    She is AAO X 4. Blunted affect. Calm and cooperative. Denies SI, HI, hallucinations, and delusions. Denies anxiety and sad thoughts at this time. States she is ready for discharge on Monday. Interacts with selected patients and staff, playing games with peer in day room upon initial assessment. Good eye contact noted. Speech normal tone and speed. Continue plan of care and provide a safe and therapeutic environment. Continue to monitor every fifteen minutes for safety.

## 2024-05-20 PROBLEM — F33.1 MAJOR DEPRESSIVE DISORDER, RECURRENT EPISODE, MODERATE: Status: ACTIVE | Noted: 2024-05-20

## 2024-05-20 PROBLEM — F43.9 STRESS REACTION, EMOTIONAL: Status: ACTIVE | Noted: 2024-05-20

## 2024-05-20 PROCEDURE — 25000003 PHARM REV CODE 250: Performed by: PSYCHIATRY & NEUROLOGY

## 2024-05-20 RX ORDER — BUPROPION HYDROCHLORIDE 150 MG/1
300 TABLET ORAL DAILY
Status: DISCONTINUED | OUTPATIENT
Start: 2024-05-20 | End: 2024-05-20 | Stop reason: HOSPADM

## 2024-05-20 RX ADMIN — BUPROPION HYDROCHLORIDE 300 MG: 150 TABLET, FILM COATED, EXTENDED RELEASE ORAL at 06:05

## 2024-05-20 NOTE — NURSING
[facility] Work/School/Court Excuse:    Tabby Livingston, was admitted to [facility] ([facility]) due to a medical emergency on 5/16/2024 under the care of Dr. Nj Harris. Please use this letter as an excuse as the patient was unable to attend work/school/court during this time. This individual was discharged from our facility on 05/20/2024 and may return to work/school/court. Contact us with any questions at [phone].    Thank you,    Staff Member Name: April Ojeda RN

## 2024-05-20 NOTE — PLAN OF CARE
Problem: Adult Behavioral Health Plan of Care  Goal: Plan of Care Review  Outcome: Met  Flowsheets (Taken 5/19/2024 2056)  Patient Agreement with Plan of Care: agrees  Plan of Care Reviewed With: patient  Goal: Patient-Specific Goal (Individualization)  Outcome: Met  Flowsheets (Taken 5/19/2024 2056)  Patient Personal Strengths:   positive educational history   no history of violence  Patient Vulnerabilities: limited support system  Goal: Adheres to Safety Considerations for Self and Others  Outcome: Met  Flowsheets (Taken 5/19/2024 2056)  Adheres to Safety Considerations for Self and Others: making progress toward outcome  Intervention: Develop and Maintain Individualized Safety Plan  Flowsheets (Taken 5/19/2024 2056)  Safety Measures:   safety rounds completed   monitored by video  Goal: Absence of New-Onset Illness or Injury  Outcome: Met  Intervention: Identify and Manage Fall Risk  Flowsheets (Taken 5/19/2024 2056)  Safety Measures:   safety rounds completed   monitored by video  Intervention: Prevent VTE (Venous Thromboembolism)  Flowsheets (Taken 5/19/2024 2056)  VTE Prevention/Management:   fluids promoted   ambulation promoted  Intervention: Prevent Infection  Flowsheets (Taken 5/19/2024 2056)  Infection Prevention:   hand hygiene promoted   rest/sleep promoted  Goal: Optimized Coping Skills in Response to Life Stressors  Outcome: Met  Flowsheets (Taken 5/19/2024 2056)  Optimized Coping Skills in Response to Life Stressors: achieves outcome  Intervention: Promote Effective Coping Strategies  Flowsheets (Taken 5/19/2024 2056)  Supportive Measures:   active listening utilized   verbalization of feelings encouraged  Goal: Develops/Participates in Therapeutic [place] to Support Successful Transition  Outcome: Met  Flowsheets (Taken 5/19/2024 2056)  Develops/Participates in Therapeutic [place] to Support Successful Transition: making progress toward outcome  Intervention: Foster Therapeutic  [place]  Flowsheets (Taken 5/19/2024 2056)  Trust Relationship/Rapport:   care explained   thoughts/feelings acknowledged   emotional support provided  Intervention: Mutually Develop Transition Plan  Flowsheets (Taken 5/19/2024 2056)  Current Discharge Risk: psychiatric illness  Readmission Within the Last 30 Days: no previous admission in last 30 days  Outpatient/Agency/Support Group Needs:   outpatient psychiatric care (specify)   outpatient medication management  Anticipated Discharge Disposition: home or self-care  Transportation Concerns: none  Patient/Family Anticipated Services at Transition: mental health services  Patient/Family Anticipates Transition to: home with family  Transportation Anticipated: family or friend will provide  Concerns to be Addressed:   medication   mental health  Goal: Rounds/Family Conference  Outcome: Met  Flowsheets (Taken 5/19/2024 2056)  Participants:   milieu/psych techs   nursing     Problem: Suicide Risk  Goal: Absence of Self-Harm  Outcome: Met  Intervention: Assess Risk to Self and Maintain Safety  Flowsheets (Taken 5/19/2024 2056)  Behavior Management: behavioral plan reviewed  Enhanced Safety Measures: monitored by video  Intervention: Promote Psychosocial Wellbeing  Flowsheets (Taken 5/19/2024 2056)  Sleep/Rest Enhancement: regular sleep/rest pattern promoted  Supportive Measures:   active listening utilized   verbalization of feelings encouraged  Family/Support System Care: self-care encouraged     Problem: Depressive Signs/Symptoms  Goal: Optimized Energy Level (Depressive Signs/Symptoms)  Outcome: Met  Flowsheets (Taken 5/19/2024 2056)  Mutually Determined Action Steps (Optimized Energy Level): grooms self without prompting  Intervention: Optimize Energy Level  Flowsheets (Taken 5/19/2024 2056)  Activity (Behavioral Health):   up ad sujey   activity encouraged  Patient Performed Hygiene:   shower   teeth brushed  Diversional Activity: television  Goal: Optimized  Cognitive Function (Depressive Signs/Symptoms)  Outcome: Met  Goal: Increased Participation and Engagement (Depressive Signs/Symptoms)  Outcome: Met  Flowsheets (Taken 5/19/2024 2056)  Mutually Determined Action Steps (Increased Participation and Engagement): initiates interaction with others  Intervention: Facilitate Participation and Engagement  Flowsheets (Taken 5/19/2024 2056)  Supportive Measures:   active listening utilized   verbalization of feelings encouraged  Diversional Activity: television  Goal: Enhanced Self-Esteem and Confidence (Depressive Signs/Symptoms)  Outcome: Met  Flowsheets (Taken 5/19/2024 2056)  Mutually Determined Action Steps (Enhanced Self-Esteem and Confidence): verbalizes successes  Intervention: Promote Confidence and Self-Esteem  Flowsheets (Taken 5/19/2024 2056)  Supportive Measures:   active listening utilized   verbalization of feelings encouraged  Goal: Improved Mood Symptoms (Depressive Signs/Symptoms)  Outcome: Met  Flowsheets (Taken 5/19/2024 2056)  Mutually Determined Action Steps (Improved Mood Symptoms):   acknowledges progress   verbalizes increased insight  Intervention: Promote Mood Improvement  Flowsheets (Taken 5/19/2024 2056)  Supportive Measures:   active listening utilized   verbalization of feelings encouraged  Diversional Activity: television  Goal: Optimized Nutrition Intake (Depressive Signs/Symptoms)  Outcome: Met  Flowsheets (Taken 5/19/2024 2056)  Mutually Determined Action Steps (Optimized Nutrition Intake): eats at meal time  Intervention: Promote Optimal Nutrition Intake  Flowsheets (Taken 5/19/2024 2056)  Nutrition Interventions: food preferences provided  Bowel Elimination Promotion:   adequate fluid intake promoted   ambulation promoted  Goal: Improved Psychomotor Symptoms (Depressive Signs/Symptoms)  Outcome: Met  Flowsheets (Taken 5/19/2024 2056)  Mutually Determined Action Steps (Improved Psychomotor Symptoms): adheres to medication  regimen  Intervention: Manage Psychomotor Movement  Flowsheets (Taken 5/19/2024 2056)  Activity (Behavioral Health):   up ad sujey   activity encouraged  Patient Performed Hygiene:   shower   teeth brushed  Diversional Activity: television  Goal: Improved Sleep (Depressive Signs/Symptoms)  Outcome: Met  Flowsheets (Taken 5/19/2024 2056)  Mutually Determined Action Steps (Improved Sleep): sleeps 4-6 hours at night  Intervention: Promote Healthy Sleep Hygiene  Flowsheets (Taken 5/19/2024 2056)  Sleep Hygiene Promotion:   awakenings minimized   regular sleep pattern promoted   room lighting adjusted   noise level reduced  Goal: Enhanced Social, Occupational or Functional Skills (Depressive Signs/Symptoms)  Outcome: Met  Flowsheets (Taken 5/19/2024 2056)  Mutually Determined Action Steps (Enhanced Social, Occupational or Functional Skills): identifies personal strengths  Intervention: Promote Social, Occupational and Functional Ability  Flowsheets (Taken 5/19/2024 2056)  Social Functional Ability Promotion: social interaction promoted     Problem: Excessive Substance Use  Goal: Optimized Energy Level (Excessive Substance Use)  Outcome: Met  Flowsheets (Taken 5/19/2024 2056)  Mutually Determined Action Steps (Optimized Energy Level): grooms self without prompting  Intervention: Optimize Energy Level  Flowsheets (Taken 5/19/2024 2056)  Activity (Behavioral Health):   up ad sujey   activity encouraged  Patient Performed Hygiene:   shower   teeth brushed  Diversional Activity: television  Goal: Improved Behavioral Control (Excessive Substance Use)  Outcome: Met  Intervention: Promote Behavior and Impulse Control  Flowsheets (Taken 5/19/2024 2056)  Behavior Management: behavioral plan reviewed  Goal: Increased Participation and Engagement (Excessive Substance Use)  Outcome: Met  Intervention: Facilitate Participation and Engagement  Flowsheets (Taken 5/19/2024 2056)  Supportive Measures:   active listening utilized    verbalization of feelings encouraged  Diversional Activity: television  Goal: Improved Physiologic Symptoms (Excessive Substance Use)  Outcome: Met  Intervention: Optimize Physiologic Function  Flowsheets (Taken 5/19/2024 2056)  Oral Nutrition Promotion: social interaction promoted  Nutrition Interventions: food preferences provided  Goal: Enhanced Social, Occupational or Functional Skills (Excessive Substance Use)  Outcome: Met  Flowsheets (Taken 5/19/2024 2056)  Mutually Determined Action Steps (Enhanced Social, Occupational or Functional Skills): identifies personal strengths  Intervention: Promote Social, Occupational and Functional Ability  Flowsheets (Taken 5/19/2024 2056)  Trust Relationship/Rapport:   care explained   thoughts/feelings acknowledged   emotional support provided  Social Functional Ability Promotion: social interaction promoted   AAOx4. Nba affect. Improved mood. Anxious about discharge. Appropriate insight. Denies suicidal  ideations and hallucinations. Compliant with medications. Reports that she is eating and sleeping well and is attending groups. Interacting well with staff and peers. No agitation or aggression noted. Continue with plan of care and q15 minute safety checks.
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
## 2024-07-29 ENCOUNTER — OFFICE VISIT (OUTPATIENT)
Dept: FAMILY MEDICINE | Facility: CLINIC | Age: 24
End: 2024-07-29
Payer: COMMERCIAL

## 2024-07-29 DIAGNOSIS — F32.A ANXIETY AND DEPRESSION: Primary | ICD-10-CM

## 2024-07-29 DIAGNOSIS — F90.0 ATTENTION DEFICIT HYPERACTIVITY DISORDER (ADHD), PREDOMINANTLY INATTENTIVE TYPE: ICD-10-CM

## 2024-07-29 DIAGNOSIS — F41.9 ANXIETY: ICD-10-CM

## 2024-07-29 DIAGNOSIS — F41.9 ANXIETY AND DEPRESSION: Primary | ICD-10-CM

## 2024-07-29 PROCEDURE — 99214 OFFICE O/P EST MOD 30 MIN: CPT | Mod: 95,,, | Performed by: STUDENT IN AN ORGANIZED HEALTH CARE EDUCATION/TRAINING PROGRAM

## 2024-07-29 PROCEDURE — 3044F HG A1C LEVEL LT 7.0%: CPT | Mod: CPTII,95,, | Performed by: STUDENT IN AN ORGANIZED HEALTH CARE EDUCATION/TRAINING PROGRAM

## 2024-07-29 RX ORDER — BUSPIRONE HYDROCHLORIDE 5 MG/1
5 TABLET ORAL 3 TIMES DAILY
Qty: 90 TABLET | Refills: 11 | Status: SHIPPED | OUTPATIENT
Start: 2024-07-29 | End: 2025-07-29

## 2024-07-29 RX ORDER — BUPROPION HYDROCHLORIDE 300 MG/1
300 TABLET ORAL DAILY
Qty: 90 TABLET | Refills: 3 | Status: SHIPPED | OUTPATIENT
Start: 2024-07-29 | End: 2025-07-29

## 2024-07-29 RX ORDER — ATOMOXETINE 40 MG/1
40 CAPSULE ORAL DAILY
Qty: 90 CAPSULE | Refills: 0 | Status: SHIPPED | OUTPATIENT
Start: 2024-07-29 | End: 2024-07-29 | Stop reason: SDUPTHER

## 2024-07-29 RX ORDER — ATOMOXETINE 40 MG/1
40 CAPSULE ORAL DAILY
Qty: 90 CAPSULE | Refills: 0 | Status: SHIPPED | OUTPATIENT
Start: 2024-07-29 | End: 2024-10-27

## 2024-07-30 PROBLEM — F90.0 ATTENTION DEFICIT HYPERACTIVITY DISORDER (ADHD), PREDOMINANTLY INATTENTIVE TYPE: Status: ACTIVE | Noted: 2024-07-30

## 2024-07-30 NOTE — ASSESSMENT & PLAN NOTE
With counselor patient states she completed the ADHD self eval which showed high inability to concentrate  Discussed with patient that I will not start a stimulant ADHD medication she must see behavioral health for that will start Strattera 40 mg daily
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
## 2024-10-16 ENCOUNTER — OFFICE VISIT (OUTPATIENT)
Dept: FAMILY MEDICINE | Facility: CLINIC | Age: 24
End: 2024-10-16
Payer: COMMERCIAL

## 2024-10-16 DIAGNOSIS — F90.0 ATTENTION DEFICIT HYPERACTIVITY DISORDER (ADHD), PREDOMINANTLY INATTENTIVE TYPE: ICD-10-CM

## 2024-10-16 DIAGNOSIS — F90.2 ATTENTION DEFICIT HYPERACTIVITY DISORDER (ADHD), COMBINED TYPE: Primary | ICD-10-CM

## 2024-10-16 DIAGNOSIS — F41.9 ANXIETY: ICD-10-CM

## 2024-10-16 DIAGNOSIS — F33.1 MAJOR DEPRESSIVE DISORDER, RECURRENT EPISODE, MODERATE: ICD-10-CM

## 2024-10-16 RX ORDER — BUPROPION HYDROCHLORIDE 300 MG/1
300 TABLET ORAL DAILY
Qty: 90 TABLET | Refills: 1 | Status: SHIPPED | OUTPATIENT
Start: 2024-10-16 | End: 2025-10-16

## 2024-10-16 RX ORDER — BUSPIRONE HYDROCHLORIDE 5 MG/1
5 TABLET ORAL 3 TIMES DAILY
Qty: 90 TABLET | Refills: 3 | Status: SHIPPED | OUTPATIENT
Start: 2024-10-16 | End: 2025-10-16

## 2024-10-16 RX ORDER — ATOMOXETINE 40 MG/1
40 CAPSULE ORAL DAILY
Qty: 90 CAPSULE | Refills: 1 | Status: SHIPPED | OUTPATIENT
Start: 2024-10-16 | End: 2025-04-14

## 2024-10-16 NOTE — ASSESSMENT & PLAN NOTE
Refill given of Strattera 40 mg daily and have placed referral for patient to be seen in behavioral health for this issue as well as major depressive disorder
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
## 2024-10-16 NOTE — ASSESSMENT & PLAN NOTE
Discussed cessation of marijuana with patient given her concerns that it caused increased paranoia  Also discussed with patient that marijuana has varying THC levels which can also contribute to her issues and it can be laced with other dangerous substances such as fentanyl  Patient verbalized understanding states she has not been smoking for the past week  Refill given Wellbutrin and BuSpar

## 2025-02-28 ENCOUNTER — OFFICE VISIT (OUTPATIENT)
Dept: BEHAVIORAL HEALTH | Facility: CLINIC | Age: 25
End: 2025-02-28
Payer: COMMERCIAL

## 2025-02-28 VITALS
BODY MASS INDEX: 24.88 KG/M2 | DIASTOLIC BLOOD PRESSURE: 89 MMHG | HEART RATE: 97 BPM | WEIGHT: 177.69 LBS | SYSTOLIC BLOOD PRESSURE: 133 MMHG | RESPIRATION RATE: 18 BRPM | TEMPERATURE: 98 F | HEIGHT: 71 IN | OXYGEN SATURATION: 100 %

## 2025-02-28 DIAGNOSIS — F41.9 ANXIETY: ICD-10-CM

## 2025-02-28 DIAGNOSIS — F90.2 ATTENTION DEFICIT HYPERACTIVITY DISORDER (ADHD), COMBINED TYPE: ICD-10-CM

## 2025-02-28 DIAGNOSIS — F33.1 MAJOR DEPRESSIVE DISORDER, RECURRENT EPISODE, MODERATE: ICD-10-CM

## 2025-02-28 PROCEDURE — 99214 OFFICE O/P EST MOD 30 MIN: CPT | Mod: PBBFAC,PN | Performed by: STUDENT IN AN ORGANIZED HEALTH CARE EDUCATION/TRAINING PROGRAM

## 2025-02-28 RX ORDER — BUPROPION HYDROCHLORIDE 300 MG/1
300 TABLET ORAL DAILY
Qty: 90 TABLET | Refills: 1 | Status: SHIPPED | OUTPATIENT
Start: 2025-02-28 | End: 2026-02-28

## 2025-02-28 RX ORDER — VILOXAZINE HYDROCHLORIDE 200 MG/1
CAPSULE, EXTENDED RELEASE ORAL
Qty: 60 CAPSULE | Refills: 2 | Status: SHIPPED | OUTPATIENT
Start: 2025-02-28

## 2025-02-28 RX ORDER — BUSPIRONE HYDROCHLORIDE 5 MG/1
5 TABLET ORAL 3 TIMES DAILY
Qty: 90 TABLET | Refills: 3 | Status: SHIPPED | OUTPATIENT
Start: 2025-02-28 | End: 2026-02-28
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
## 2025-03-18 ENCOUNTER — TELEPHONE (OUTPATIENT)
Dept: BEHAVIORAL HEALTH | Facility: CLINIC | Age: 25
End: 2025-03-18
Payer: COMMERCIAL

## 2025-03-18 DIAGNOSIS — F90.2 ATTENTION DEFICIT HYPERACTIVITY DISORDER (ADHD), COMBINED TYPE: Primary | ICD-10-CM

## 2025-03-18 RX ORDER — METHYLPHENIDATE HYDROCHLORIDE 10 MG/1
10 TABLET ORAL 2 TIMES DAILY
Qty: 28 TABLET | Refills: 0 | Status: SHIPPED | OUTPATIENT
Start: 2025-03-18 | End: 2025-04-01

## 2025-03-18 NOTE — TELEPHONE ENCOUNTER
Unable to get PA for qelbree approved.  Spoke to pt who reported that qelbree was beneficial thus far.  Pt understood need to switch to alternative despite positive effect.  Does not want to return to atomoxetine due to cost concerns.  Provides IC for trial of ritalin.  Will start with 10mg bid.  Discussed potential SE including but not limited to poor appetite, weight loss, insomnia, palpitations, chest pain, addictive behavior.  Will given 14-day trial prescription, pt to call in 1-2 weeks to report response.  Pt in agreement with this plan.  All questions answered.

## 2025-04-04 DIAGNOSIS — F90.2 ATTENTION DEFICIT HYPERACTIVITY DISORDER (ADHD), COMBINED TYPE: Primary | ICD-10-CM

## 2025-04-04 RX ORDER — METHYLPHENIDATE HYDROCHLORIDE 10 MG/1
10 TABLET ORAL 2 TIMES DAILY
Qty: 60 TABLET | Refills: 0 | Status: SHIPPED | OUTPATIENT
Start: 2025-04-04 | End: 2025-05-04

## 2025-04-04 NOTE — TELEPHONE ENCOUNTER
Returned call to patient. Stated that once provider is finished with clinic this morning he will be able to address messages/refills and provide a refill to last until next appt on 4/11. Patient verbalized understanding and had no other concerns at this time.       ----- Message from Jojo sent at 4/4/2025 10:13 AM CDT -----  .Who Called: Tabby Bahena is requesting a sooner appointment. Caller declined first available appointment listed below. Caller will not accept being placed on the waitlist and is requesting a message be sent to doctor.When is the first available appointment? 04/11/2025Options offered (Virtual Visit, Urgent Care): Virtual Visit OfferedSymptoms: n/aPreferred Method of Contact: Phone CallPatient's Preferred Phone Number on File: [phone] Best Call Back Number, if different:Additional Information: Pt is requesting to be sooner than her appointment next Friday 04/11/2025. Offered pt virtual and she will definitely/ prefer to do a virtual appointment if sooner. Please advise, thank you.  ----- Message -----  From: Jojo Pack  Sent: 4/4/2025  10:18 AM CDT  To: [facility] PRESTON Staff    .Who Called: Tabby LEWIS RupeshsharonLongbrooks is requesting a sooner appointment. Caller declined first available appointment listed below. Caller will not accept being placed on the waitlist and is requesting a message be sent to doctor.When is the first available appointment? 04/11/2025Options offered (Virtual Visit, Urgent Care): Virtual Visit OfferedSymptoms: n/aPreferred Method of Contact: Phone CallPatient's Preferred Phone Number on File: [phone] Best Call Back Number, if different:Additional Information: Pt is requesting to be sooner than her appointment next Friday 04/11/2025. Offered pt virtual and she will definitely do a virtual appointment if sooner. Please advise, thank you.

## 2025-04-04 NOTE — TELEPHONE ENCOUNTER
Please see attached refill request.    Next scheduled office visit: 4/11/2025.    Last office visit: 2/28/2025.     Thank you!          ----- Message from Sierra sent at 4/4/2025  8:06 AM CDT -----  .Who Called: Tabby Sanchezefill or New Rx:RefillRX Name and Strength:methylphenidate HCl (RITALIN) 10 MG tabletHow is the patient currently taking it? (ex. 1XDay):2x per day Is this a 30 day or 90 day RX: 30Local or Mail Order:local List of preferred pharmacies on file (remove unneeded): [facility] different Pharmacy is requested, enter Pharmacy information here including location and phone number: Ordering Provider: Margot Method of Contact: Phone CallPatient's Preferred Phone Number on File: [phone] Best Call Back Number, if different:Additional Information: methylphenidate HCl (RITALIN) 10 MG tabletPt stated she's out of medication and need it to function --aware her appt is coming up but need refill asap

## 2025-04-10 ENCOUNTER — OFFICE VISIT (OUTPATIENT)
Dept: BEHAVIORAL HEALTH | Facility: CLINIC | Age: 25
End: 2025-04-10
Payer: COMMERCIAL

## 2025-04-10 VITALS
DIASTOLIC BLOOD PRESSURE: 89 MMHG | WEIGHT: 181.69 LBS | OXYGEN SATURATION: 100 % | SYSTOLIC BLOOD PRESSURE: 137 MMHG | HEART RATE: 101 BPM | BODY MASS INDEX: 25.34 KG/M2 | TEMPERATURE: 98 F

## 2025-04-10 DIAGNOSIS — F41.9 ANXIETY: ICD-10-CM

## 2025-04-10 DIAGNOSIS — F33.1 MAJOR DEPRESSIVE DISORDER, RECURRENT EPISODE, MODERATE: ICD-10-CM

## 2025-04-10 DIAGNOSIS — F90.2 ATTENTION DEFICIT HYPERACTIVITY DISORDER (ADHD), COMBINED TYPE: Primary | ICD-10-CM

## 2025-04-10 PROCEDURE — 3079F DIAST BP 80-89 MM HG: CPT | Mod: CPTII,,, | Performed by: STUDENT IN AN ORGANIZED HEALTH CARE EDUCATION/TRAINING PROGRAM

## 2025-04-10 PROCEDURE — 99213 OFFICE O/P EST LOW 20 MIN: CPT | Mod: PBBFAC,PN | Performed by: STUDENT IN AN ORGANIZED HEALTH CARE EDUCATION/TRAINING PROGRAM

## 2025-04-10 PROCEDURE — 3008F BODY MASS INDEX DOCD: CPT | Mod: CPTII,,, | Performed by: STUDENT IN AN ORGANIZED HEALTH CARE EDUCATION/TRAINING PROGRAM

## 2025-04-10 PROCEDURE — 1160F RVW MEDS BY RX/DR IN RCRD: CPT | Mod: CPTII,,, | Performed by: STUDENT IN AN ORGANIZED HEALTH CARE EDUCATION/TRAINING PROGRAM

## 2025-04-10 PROCEDURE — 1159F MED LIST DOCD IN RCRD: CPT | Mod: CPTII,,, | Performed by: STUDENT IN AN ORGANIZED HEALTH CARE EDUCATION/TRAINING PROGRAM

## 2025-04-10 PROCEDURE — 99214 OFFICE O/P EST MOD 30 MIN: CPT | Mod: S$PBB,,, | Performed by: STUDENT IN AN ORGANIZED HEALTH CARE EDUCATION/TRAINING PROGRAM

## 2025-04-10 PROCEDURE — 3075F SYST BP GE 130 - 139MM HG: CPT | Mod: CPTII,,, | Performed by: STUDENT IN AN ORGANIZED HEALTH CARE EDUCATION/TRAINING PROGRAM

## 2025-04-10 RX ORDER — BUSPIRONE HYDROCHLORIDE 5 MG/1
5 TABLET ORAL 3 TIMES DAILY
Qty: 90 TABLET | Refills: 3 | Status: SHIPPED | OUTPATIENT
Start: 2025-04-10 | End: 2026-04-10

## 2025-04-10 RX ORDER — VILOXAZINE HYDROCHLORIDE 200 MG/1
CAPSULE, EXTENDED RELEASE ORAL
Qty: 60 CAPSULE | Refills: 5 | Status: SHIPPED | OUTPATIENT
Start: 2025-04-10

## 2025-04-10 RX ORDER — BUPROPION HYDROCHLORIDE 300 MG/1
300 TABLET ORAL DAILY
Qty: 90 TABLET | Refills: 1 | Status: SHIPPED | OUTPATIENT
Start: 2025-04-10 | End: 2026-04-10

## 2025-04-10 NOTE — PROGRESS NOTES
"Outpatient Psychiatry Follow-Up Visit    4/10/2025    Clinical Status of Patient:  Outpatient (Ambulatory)    Chief Complaint:  Tabby Livingston is a 24 y.o. female who presents today for follow-up of depression, anxiety, and attention problems. Patient last seen for initial evaluation on 2/28/2025. Met with patient.      Interval History and Content of Current Session:  Interim Events/Subjective Report/Content of Current Session:   Pt reports doing "ok"  overall.  Says that she prefers Qelbree over ritalin, finds that qelbree worked better for her.  Says that she was better able to make plan and accomplish goals.  Reports "good" mood, denies currently feeling depressed, improved anxiety  +crying spells.  Sleeping good, tired on awakening.  Appetite stable, weight stable.  Energy lower, motivation lower, concentration decrease.  Endorses irritability, endorses hopelessness.  Denies SI/HI/AVH/paranoia, denies plan or desire for self harm or harm to others.  Denies SE from current regimen Denies somatic complaints.   Pt voices desire to adjust regimen to address ongoing symptoms.      Psychiatric Review of Systems-is patient experiencing or having changes in  Integrated into HPI above.     Review of Systems   PSYCHIATRIC: Pertinant items are noted in the narrative.  CONSTITUTIONAL: No weight gain or loss.  MUSCULOSKELETAL: No pain or stiffness of the joints.  NEUROLOGIC: No weakness, sensory changes, seizures, confusion, memory loss, tremor or other abnormal movements.  CARDIAC: No CP, no palpitations  RESPIRATORY: No shortness of breath.  CARDIOVASCULAR: No tachycardia or chest pain.  GASTROINTESTINAL: No nausea, vomiting, pain, constipation or diarrhea.    Past Medical, Family and Social History: The patient's past medical, family and social history have been reviewed and updated as appropriate within the electronic medical record - see encounter notes.    Compliance: good    Side effects: denies    Risk " "Parameters:  Patient reports no suicidal ideation  Patient reports no homicidal ideation  Patient reports no self-injurious behavior  Patient reports no violent behavior    Exam (detailed: at least 9 elements; comprehensive: all 15 elements)   Constitutional  Vitals:  Most recent vital signs, dated less than 90 days prior to this appointment, were reviewed.   Vitals:    04/10/25 1400   BP: 137/89   Pulse: 101   Temp: 98.2 °F (36.8 °C)   SpO2: 100%   Weight: 82.4 kg (181 lb 11.2 oz)        General:   Constitutional: No acute distress, appears stated age, casually dressed    Neurologic:   Motor: moves all extremities spontaneously and without difficulty, no abnormal involuntary movements observed  Gait: normal gait and station    Mental status examination:   Appearance: appears stated age, casually dressed, no acute distress  Behavior: unremarkable for situation, calm and cooperative  Mood: "ok"  Affect: mood congruent and constricted  Thought process: linear and goal directed  Thought content: no plan or desire for self harm or harm to others, denies paranoia, no delusional ideation volunteered  Perceptions: denies hallucinations or other altered perceptions  Associations: appropriate for conversation  Orientation: oriented to day of week, month, year, location, and situation  Language: English, fluid  Attention: able to attend to interview  Insight: good  Judgement: good    PHQ9:           2/28/2025    12:39 PM   RAGHU-7   Was test performed? Yes   1. Feeling nervous, anxious, or on edge? More than half the days   2. Not being able to stop or control worrying? More than half the days   3. Worrying too much about different things? Nearly everyday   4. Trouble relaxing? Nearly everyday   5. Being so restless that it is hard to sit still? Nearly everyday   6. Becoming easily annoyed or irritable? Nearly everyday   7. Feeling afraid as if something awful might happen? More than half the days   8. If you checked off any " problems, how difficult have these problems made it for you to do your work, take care of things at home, or get along with other people? Extremely difficult   RAGHU-7 Score 18   Number answered (out of first 7) 7   Interpretation Severe Anxiety       Assessment and Diagnosis   Status/Progress: Based on the examination today, the patient's problem(s) is/are adequately but not ideally controlled.  New problems have not been presented today.   Co-morbidities and Lack of compliance are not complicating management of the primary condition.  Number of separate conditions addressed during today's visit: 3 (mood fair control, anxiety well controlled, ADHD inadequately controlled) .  Medication management: yes: Starting a medication, Stopping a medication, Refilling a prescription, and Deciding to continue a pre-existing prescription.   Are referral(s) being ordered today: No.  Complexity (level) of medical decision making employed in the encounter: HIGH.    General Impression:    ICD-10-CM ICD-9-CM   1. Attention deficit hyperactivity disorder (ADHD), combined type  F90.2 314.01   2. Anxiety  F41.9 300.00   3. Major depressive disorder, recurrent episode, moderate  F33.1 296.32     Intervention/Counseling/Treatment Plan   Continue buspirone 5mg tid  Continue wellbutrin XL 300mg daily  Stop ritalin 2/2 lack of benefit  Start viloxazine 200mg daily x7 days, then 400mg daily thereafter, discussed potential SE including but not limited to GI upset, headache, irritability, anxiety  No need for PEC as pt is not an imminent danger to self or others or gravely disabled due to acute psychiatric illness  Discussed that pt should either call clinic for psychiatric crisis symptoms or present to nearest emergency room    Discussed with patient informed consent including diagnosis, risks and benefits of proposed treatment above vs. alternative treatments vs. no treatment, as well as serious and common side effects of these treatments, and  the inherent unpredictability of individual responses to these treatments. The patient expresses understanding of the above and displays the capacity to agree with this current plan. Patient also agrees that, currently, the benefits outweigh the risks and would like to pursue treatment at this time, and had no other questions.    Instructions:  Take all medications as prescribed.    Abstain from recreational drugs and alcohol.  Present to ED or call 911 for SI/HI plan or intent, psychosis, or medical emergency.    Return to Clinic: Follow up if symptoms worsen or fail to improve.  Given that provider is leaving in the near future, will send list of psychiatry providers to pt's portal account to assist with continuation of care.     Total time:   The total time for services performed on the date of the encounter (including review of prior visit notes, review of notes from other providers, review of results from laboratory/imaging studies, face-to-face time with patient, and time spent on other activities directly related to patient care): 35 minutes.    Manny Cuevas MD  [facility]

## 2025-04-21 ENCOUNTER — TELEPHONE (OUTPATIENT)
Dept: FAMILY MEDICINE | Facility: CLINIC | Age: 25
End: 2025-04-21
Payer: COMMERCIAL

## 2025-04-21 NOTE — TELEPHONE ENCOUNTER
----- Message from Anne sent at 4/21/2025 10:12 AM CDT -----  4/19/25  9:24 AMRefills have been requested for the the following medications;     viloxazine (QELBREE) 200 mg Cp24 [Manny Cuevas MD] Preferred pharmacy: [id number] [address] [place] [address] AT [facility] Patient called again is needing a Prior Auth

## 2025-04-22 ENCOUNTER — TELEPHONE (OUTPATIENT)
Dept: BEHAVIORAL HEALTH | Facility: CLINIC | Age: 25
End: 2025-04-22
Payer: COMMERCIAL

## 2025-04-22 DIAGNOSIS — F90.2 ATTENTION DEFICIT HYPERACTIVITY DISORDER (ADHD), COMBINED TYPE: Primary | ICD-10-CM

## 2025-04-22 NOTE — TELEPHONE ENCOUNTER
Spoke to CAREY Montes De Oca rep from [facility]  They did  not approve Qelbree  After speaking to the rep I informed him that pt had also tried Rtialin and Strattera  He stated that information was not included on the original PA so  he would resubmit the PA including that information and would fax the decision in 5-8 days.     Please advise  Sarita Prakash, ALEX  4/22/2025, 12:05 PM

## 2025-04-22 NOTE — TELEPHONE ENCOUNTER
----- Message from Kyjameskenrick sent at 4/22/2025  8:29 AM CDT -----  .Who Called: Tabby Bahena is requesting assistance/information from provider's office.Symptoms (please be specific): pt is calling for the second time about her med PA needed for the  QELBREE. States that she needs it for work to keep her focus and is upset that no one called her back on yesterday as told. Called the back line no answer How long has patient had these symptoms:  n/aList of preferred pharmacies on file (remove unneeded): [unfilled]If different, enter pharmacy into here including location and phone number: zofia Preferred Method of Contact: Phone CallPatient's Preferred Phone Number on File: [phone] Best Call Back Number, if different:Additional Information:

## 2025-04-23 NOTE — TELEPHONE ENCOUNTER
Spoke to patient  She is requesting Adderall  Please advise  Sarita Prakash, ALEX  4/23/2025, 7:52 AM

## 2025-04-24 RX ORDER — DEXTROAMPHETAMINE SACCHARATE, AMPHETAMINE ASPARTATE MONOHYDRATE, DEXTROAMPHETAMINE SULFATE AND AMPHETAMINE SULFATE 3.75; 3.75; 3.75; 3.75 MG/1; MG/1; MG/1; MG/1
15 CAPSULE, EXTENDED RELEASE ORAL EVERY MORNING
Qty: 14 CAPSULE | Refills: 0 | Status: SHIPPED | OUTPATIENT
Start: 2025-04-24

## 2025-04-28 ENCOUNTER — PATIENT MESSAGE (OUTPATIENT)
Dept: BEHAVIORAL HEALTH | Facility: CLINIC | Age: 25
End: 2025-04-28
Payer: COMMERCIAL

## 2025-04-28 ENCOUNTER — TELEPHONE (OUTPATIENT)
Dept: BEHAVIORAL HEALTH | Facility: CLINIC | Age: 25
End: 2025-04-28
Payer: COMMERCIAL

## 2025-04-28 DIAGNOSIS — F90.2 ATTENTION DEFICIT HYPERACTIVITY DISORDER (ADHD), COMBINED TYPE: ICD-10-CM

## 2025-04-28 NOTE — TELEPHONE ENCOUNTER
----- Message from Manny Cuevas MD sent at 4/28/2025  1:47 PM CDT -----  Could you call her back and ask her to take 2 capsules at a time (for a couple of days)?  I'll send in a new prescription at that point.  But I don't want to send in too many prescriptions because her insurance and her pharmacy will start giving us trouble.  ----- Message -----  From: Naomi Del Toro MA  Sent: 4/28/2025  11:40 AM CDT  To: Manny Cuevas MD    Patient requesting an increase of Adderall XR 15mg. Please advise  ----- Message -----  From: Wes Reddy  Sent: 4/28/2025  11:24 AM CDT  To: Mason PRESTON Staff    Who Called: Tabby Bahena is requesting assistance/information from provider's office.Symptoms (please be specific):   How long has patient had these symptoms:   List of preferred pharmacies on file (remove unneeded): [unfilled]If different, enter pharmacy into here including location and phone number:  Preferred Method of Contact: Phone CallPatient's Preferred Phone Number on File: [phone] Best Call Back Number, if different:Additional Information:   dextroamphetamine-amphetamine (ADDERALL XR) 15 MG 24 hr capsulePt would like to speak with nurse about upping her med , pt stated that med is not lasting all day

## 2025-05-02 DIAGNOSIS — F90.2 ATTENTION DEFICIT HYPERACTIVITY DISORDER (ADHD), COMBINED TYPE: Primary | ICD-10-CM

## 2025-05-02 RX ORDER — DEXTROAMPHETAMINE SACCHARATE, AMPHETAMINE ASPARTATE MONOHYDRATE, DEXTROAMPHETAMINE SULFATE AND AMPHETAMINE SULFATE 7.5; 7.5; 7.5; 7.5 MG/1; MG/1; MG/1; MG/1
30 CAPSULE, EXTENDED RELEASE ORAL EVERY MORNING
Qty: 30 CAPSULE | Refills: 0 | Status: SHIPPED | OUTPATIENT
Start: 2025-05-02

## 2025-05-02 RX ORDER — DEXTROAMPHETAMINE SACCHARATE, AMPHETAMINE ASPARTATE MONOHYDRATE, DEXTROAMPHETAMINE SULFATE AND AMPHETAMINE SULFATE 7.5; 7.5; 7.5; 7.5 MG/1; MG/1; MG/1; MG/1
30 CAPSULE, EXTENDED RELEASE ORAL EVERY MORNING
Qty: 30 CAPSULE | Refills: 0 | Status: SHIPPED | OUTPATIENT
Start: 2025-06-01

## 2025-05-02 NOTE — TELEPHONE ENCOUNTER
Received refill request for adderlal XR.  PDMP reviewed and demonstrated no abnormal filling patterns.  Refill submitted as requested.

## 2025-05-02 NOTE — TELEPHONE ENCOUNTER
Patient came into clinic requesting refill of adderall with requested increase as discussed in last message

## 2025-06-27 ENCOUNTER — TELEPHONE (OUTPATIENT)
Dept: BEHAVIORAL HEALTH | Facility: CLINIC | Age: 25
End: 2025-06-27
Payer: COMMERCIAL

## 2025-06-30 ENCOUNTER — OFFICE VISIT (OUTPATIENT)
Dept: BEHAVIORAL HEALTH | Facility: CLINIC | Age: 25
End: 2025-06-30
Payer: COMMERCIAL

## 2025-06-30 VITALS
BODY MASS INDEX: 24.62 KG/M2 | DIASTOLIC BLOOD PRESSURE: 74 MMHG | HEIGHT: 71 IN | SYSTOLIC BLOOD PRESSURE: 106 MMHG | WEIGHT: 175.88 LBS

## 2025-06-30 DIAGNOSIS — F90.2 ATTENTION DEFICIT HYPERACTIVITY DISORDER (ADHD), COMBINED TYPE: Primary | ICD-10-CM

## 2025-06-30 DIAGNOSIS — F33.1 MAJOR DEPRESSIVE DISORDER, RECURRENT EPISODE, MODERATE: ICD-10-CM

## 2025-06-30 DIAGNOSIS — F41.9 ANXIETY: ICD-10-CM

## 2025-06-30 DIAGNOSIS — F31.4 BIPOLAR DISORDER, CURRENT EPISODE DEPRESSED, SEVERE, WITHOUT PSYCHOTIC FEATURES: Chronic | ICD-10-CM

## 2025-06-30 PROBLEM — F31.9 BIPOLAR DISORDER: Chronic | Status: ACTIVE | Noted: 2025-06-30

## 2025-06-30 PROCEDURE — 3008F BODY MASS INDEX DOCD: CPT | Mod: CPTII,,, | Performed by: NURSE PRACTITIONER

## 2025-06-30 PROCEDURE — 3074F SYST BP LT 130 MM HG: CPT | Mod: CPTII,,, | Performed by: NURSE PRACTITIONER

## 2025-06-30 PROCEDURE — 1159F MED LIST DOCD IN RCRD: CPT | Mod: CPTII,,, | Performed by: NURSE PRACTITIONER

## 2025-06-30 PROCEDURE — 3078F DIAST BP <80 MM HG: CPT | Mod: CPTII,,, | Performed by: NURSE PRACTITIONER

## 2025-06-30 PROCEDURE — 1160F RVW MEDS BY RX/DR IN RCRD: CPT | Mod: CPTII,,, | Performed by: NURSE PRACTITIONER

## 2025-06-30 PROCEDURE — 99214 OFFICE O/P EST MOD 30 MIN: CPT | Mod: S$PBB,,, | Performed by: NURSE PRACTITIONER

## 2025-06-30 PROCEDURE — 99213 OFFICE O/P EST LOW 20 MIN: CPT | Mod: PBBFAC,PN | Performed by: NURSE PRACTITIONER

## 2025-06-30 RX ORDER — BUPROPION HYDROCHLORIDE 300 MG/1
300 TABLET ORAL DAILY
Qty: 90 TABLET | Refills: 1 | Status: SHIPPED | OUTPATIENT
Start: 2025-06-30 | End: 2026-06-30

## 2025-06-30 RX ORDER — VILOXAZINE HYDROCHLORIDE 200 MG/1
CAPSULE, EXTENDED RELEASE ORAL
Qty: 60 CAPSULE | Refills: 5 | Status: SHIPPED | OUTPATIENT
Start: 2025-06-30 | End: 2025-06-30 | Stop reason: DRUGHIGH

## 2025-06-30 RX ORDER — BUSPIRONE HYDROCHLORIDE 5 MG/1
5 TABLET ORAL 3 TIMES DAILY
Qty: 90 TABLET | Refills: 3 | Status: SHIPPED | OUTPATIENT
Start: 2025-06-30 | End: 2026-06-30

## 2025-06-30 NOTE — PROGRESS NOTES
"  Initial Visit  06/30/2025  HPI: Tabby Livingston is a 24 y.o. female here today for medication management of ADHD, anxiety, and MDD. Patient reports being given a Dx of Bipolar DO  Past Medical History: No past medical history on file.     Last visit:  Patient was last seen by Dr. Cuevas on 04/10/2025.  At that time she was to continue buspirone 5mg tid; continue wellbutrin XL 300mg daily; stop ritalin 2/2 lack of benefit; start viloxazine 200mg daily x7 days, then 400mg daily thereafter.    This visit:  This provider is assuming care of patient from Dr. Cuevas.  Today patient states that her depression, anxiety, energy level, and sleep are worse.  She states that she notices that she is "in my head a lot;" "my mind runs 1000 miles a minute.  I feel bad for certain things.  Hard to let things go." It seems that she is expressing guilt and shame.  On the PHQ she indicates that nearly every day over the last 2 weeks she has been bothered by thoughts that she would be better off dead or of harming herself.  She states that she is still taking all of her medications as prescribed.  She recently tried Adderall but states that she prefers Qelbree 400mg a day.   But, she is having difficulty affording the Qelbree     PHQ:  06/30/2025:  20 for severe    C-SSRS  06/30/2025: low risk    RAGHU:   06/30/2025: 21 severe      Mental Status Evaluation:  Appearance:  unremarkable, age appropriate   Behavior:  normal, cooperative   Speech:  no latency; no press   Mood:  dysthymic   Affect:  congruent and appropriate   Thought Process:  normal and logical   Thought Content:  normal, no suicidality, no homicidality, delusions, or paranoia   Sensorium:  grossly intact   Cognition:  grossly intact   Insight:  intact   Judgment:  behavior is adequate to circumstances     Impression:      ICD-10-CM ICD-9-CM   1. Attention deficit hyperactivity disorder (ADHD), combined type  F90.2 314.01   2. Anxiety  F41.9 300.00   3. Major depressive " "disorder, recurrent episode, moderate  F33.1 296.32   4. Bipolar disorder, current episode depressed, severe, without psychotic features  F31.4 296.53        Plan:  1. Continue buspirone 5mg tid  2. Continue wellbutrin XL 300mg daily  4. Continue Qelbree 400mg daily thereafter, discussed potential SE including but not limited to GI upset, headache, irritability, anxiety  5. Continue 1:1 therapy    No need for PEC as pt is not an imminent danger to self or others or gravely disabled due to acute psychiatric illness     Discussed that pt should either call clinic for psychiatric crisis symptoms or present to nearest emergency room     Discussed with patient informed consent including diagnosis, risks and benefits of proposed treatment above vs. alternative treatments vs. no treatment, as well as serious and common side effects of these treatments, and the inherent unpredictability of individual responses to these treatments. The patient expresses understanding of the above and displays the capacity to agree with this current plan. Patient also agrees that, currently, the benefits outweigh the risks and would like to pursue treatment at this time, and had no other questions.     Instructions:  Take all medications as prescribed.    2. Abstain from recreational drugs and alcohol.  3. Present to ED or call 911 for SI/HI plan or intent, psychosis, or medical emergency.    Follow-up  Follow up in about 3 months (around 9/30/2025) for medication management, Virtual Visit.      Outpatient Psychiatry Follow-Up Visit  04/10/2025   Clinical Status of Patient:  Outpatient (Ambulatory)  Chief Complaint:  Tabby Livingston is a 24 y.o. female who presents today for follow-up of depression, anxiety, and attention problems. Patient last seen for initial evaluation on 2/28/2025. Met with patient.       Interval History and Content of Current Session:  Interim Events/Subjective Report/Content of Current Session:   Pt reports doing "ok"  " "overall.  Says that she prefers Qelbree over ritalin, finds that qelbree worked better for her.  Says that she was better able to make plan and accomplish goals.  Reports "good" mood, denies currently feeling depressed, improved anxiety  +crying spells.  Sleeping good, tired on awakening.  Appetite stable, weight stable.  Energy lower, motivation lower, concentration decrease.  Endorses irritability, endorses hopelessness.  Denies SI/HI/AVH/paranoia, denies plan or desire for self harm or harm to others.  Denies SE from current regimen Denies somatic complaints.   Pt voices desire to adjust regimen to address ongoing symptoms.       Psychiatric Review of Systems-is patient experiencing or having changes in  Integrated into HPI above.      Review of Systems   PSYCHIATRIC: Pertinant items are noted in the narrative.  CONSTITUTIONAL: No weight gain or loss.  MUSCULOSKELETAL: No pain or stiffness of the joints.  NEUROLOGIC: No weakness, sensory changes, seizures, confusion, memory loss, tremor or other abnormal movements.  CARDIAC: No CP, no palpitations  RESPIRATORY: No shortness of breath.  CARDIOVASCULAR: No tachycardia or chest pain.  GASTROINTESTINAL: No nausea, vomiting, pain, constipation or diarrhea.     Past Medical, Family and Social History: The patient's past medical, family and social history have been reviewed and updated as appropriate within the electronic medical record - see encounter notes.     Compliance: good     Side effects: denies     Risk Parameters:  Patient reports no suicidal ideation  Patient reports no homicidal ideation  Patient reports no self-injurious behavior  Patient reports no violent behavior     Exam (detailed: at least 9 elements; comprehensive: all 15 elements)   Constitutional  Vitals:  Most recent vital signs, dated less than 90 days prior to this appointment, were reviewed.       Vitals:     04/10/25 1400   BP: 137/89   Pulse: 101   Temp: 98.2 °F (36.8 °C)   SpO2: 100% " "  Weight: 82.4 kg (181 lb 11.2 oz)         General:   Constitutional: No acute distress, appears stated age, casually dressed     Neurologic:   Motor: moves all extremities spontaneously and without difficulty, no abnormal involuntary movements observed  Gait: normal gait and station     Mental status examination:   Appearance: appears stated age, casually dressed, no acute distress  Behavior: unremarkable for situation, calm and cooperative  Mood: "ok"  Affect: mood congruent and constricted  Thought process: linear and goal directed  Thought content: no plan or desire for self harm or harm to others, denies paranoia, no delusional ideation volunteered  Perceptions: denies hallucinations or other altered perceptions  Associations: appropriate for conversation  Orientation: oriented to day of week, month, year, location, and situation  Language: English, fluid  Attention: able to attend to interview  Insight: good  Judgement: good     PHQ9:          2/28/2025    12:39 PM   RAGHU-7   Was test performed? Yes   1. Feeling nervous, anxious, or on edge? More than half the days   2. Not being able to stop or control worrying? More than half the days   3. Worrying too much about different things? Nearly everyday   4. Trouble relaxing? Nearly everyday   5. Being so restless that it is hard to sit still? Nearly everyday   6. Becoming easily annoyed or irritable? Nearly everyday   7. Feeling afraid as if something awful might happen? More than half the days   8. If you checked off any problems, how difficult have these problems made it for you to do your work, take care of things at home, or get along with other people? Extremely difficult   RAGHU-7 Score 18   Number answered (out of first 7) 7   Interpretation Severe Anxiety         Assessment and Diagnosis   Status/Progress: Based on the examination today, the patient's problem(s) is/are adequately but not ideally controlled.  New problems have not been presented today.   " Co-morbidities and Lack of compliance are not complicating management of the primary condition.  Number of separate conditions addressed during today's visit: 3 (mood fair control, anxiety well controlled, ADHD inadequately controlled) .  Medication management: yes: Starting a medication, Stopping a medication, Refilling a prescription, and Deciding to continue a pre-existing prescription.   Are referral(s) being ordered today: No.  Complexity (level) of medical decision making employed in the encounter: HIGH.     General Impression:      ICD-10-CM ICD-9-CM   1. Attention deficit hyperactivity disorder (ADHD), combined type  F90.2 314.01   2. Anxiety  F41.9 300.00   3. Major depressive disorder, recurrent episode, moderate  F33.1 296.32      Intervention/Counseling/Treatment Plan   Continue buspirone 5mg tid  Continue wellbutrin XL 300mg daily  Stop ritalin 2/2 lack of benefit  Start viloxazine 200mg daily x7 days, then 400mg daily thereafter, discussed potential SE including but not limited to GI upset, headache, irritability, anxiety  No need for PEC as pt is not an imminent danger to self or others or gravely disabled due to acute psychiatric illness  Discussed that pt should either call clinic for psychiatric crisis symptoms or present to nearest emergency room     Discussed with patient informed consent including diagnosis, risks and benefits of proposed treatment above vs. alternative treatments vs. no treatment, as well as serious and common side effects of these treatments, and the inherent unpredictability of individual responses to these treatments. The patient expresses understanding of the above and displays the capacity to agree with this current plan. Patient also agrees that, currently, the benefits outweigh the risks and would like to pursue treatment at this time, and had no other questions.     Instructions:  Take all medications as prescribed.    Abstain from recreational drugs and  alcohol.  Present to ED or call 911 for SI/HI plan or intent, psychosis, or medical emergency.     Return to Clinic: Follow up if symptoms worsen or fail to improve.  Given that provider is leaving in the near future, will send list of psychiatry providers to pt's portal account to assist with continuation of care.      Total time:   The total time for services performed on the date of the encounter (including review of prior visit notes, review of notes from other providers, review of results from laboratory/imaging studies, face-to-face time with patient, and time spent on other activities directly related to patient care): 35 minutes.     Manny Cuevas MD  [facility]

## 2025-07-03 ENCOUNTER — TELEPHONE (OUTPATIENT)
Dept: BEHAVIORAL HEALTH | Facility: CLINIC | Age: 25
End: 2025-07-03
Payer: COMMERCIAL

## 2025-07-03 NOTE — TELEPHONE ENCOUNTER
Copied from CRM    Additional Information: Pt states she wants a RX for the samples that Dr Cuevas gave her. Not sure of the name; the RX needs a PA. She did speak to WINSOME Duckworth about the meds at her last appt.Please call pt for clarification. I could not understand the name of the Med she was saying and she did not know the spelling of it.      Spoke to patient,  She is requesting samples of Qelbree    I sent PA to her plan and it was not approved  Please advise

## 2025-07-14 DIAGNOSIS — F90.2 ATTENTION DEFICIT HYPERACTIVITY DISORDER (ADHD), COMBINED TYPE: ICD-10-CM

## 2025-07-15 NOTE — TELEPHONE ENCOUNTER
Spoke to patient  Informed her that we had sent all of the information to the [facility] and he stated we should hear something by the end of the day today.  Pt verbalized understanding.

## 2025-07-15 NOTE — TELEPHONE ENCOUNTER
Copied from CRM #[id number]. Topic: Medications - Medication Authorization  >> Jul 14, 2025  3:30 PM Cherelle wrote:  Who Called: Tabby Livingston    Caller is requesting assistance/information from provider's office.    Symptoms (please be specific):    How long has patient had these symptoms:    List of preferred pharmacies on file (remove unneeded): [unfilled]  If different, enter pharmacy into here including location and phone number:       Preferred Method of Contact: Phone Call  Patient's Preferred Phone Number on File: [phone]   Best Call Back Number, if different:  Additional Information: PT is wondering if her medication got approved by insurance.

## 2025-07-16 ENCOUNTER — TELEPHONE (OUTPATIENT)
Dept: BEHAVIORAL HEALTH | Facility: CLINIC | Age: 25
End: 2025-07-16
Payer: COMMERCIAL

## 2025-07-16 NOTE — TELEPHONE ENCOUNTER
Spoke to patient on yesterday  Informed her that we had contacted the [employer] rep and he was in the process of helping with the PA process  Pt picked up [employer] samples yesterday  Will call her when the rep contacts us  Pt verbalized understanding

## 2025-07-29 ENCOUNTER — TELEPHONE (OUTPATIENT)
Dept: BEHAVIORAL HEALTH | Facility: CLINIC | Age: 25
End: 2025-07-29
Payer: COMMERCIAL

## 2025-07-29 DIAGNOSIS — F90.2 ATTENTION DEFICIT HYPERACTIVITY DISORDER (ADHD), COMBINED TYPE: Primary | ICD-10-CM

## 2025-07-29 RX ORDER — GUANFACINE 2 MG/1
2 TABLET, EXTENDED RELEASE ORAL NIGHTLY
Qty: 14 TABLET | Refills: 0 | Status: SHIPPED | OUTPATIENT
Start: 2025-07-29

## 2025-07-29 NOTE — TELEPHONE ENCOUNTER
Copied from CRM #[id number]. Topic: Medications - Medication Question  >> Jul 29, 2025 10:28 AM Violette wrote:  .Who Called: Tabby Livingston    Caller is requesting assistance/information from provider's office.    Symptoms (please be specific):    How long has patient had these symptoms:    List of preferred pharmacies on file (remove unneeded): [unfilled]  If different, enter pharmacy into here including location and phone number:       Preferred Method of Contact: Phone Call  Patient's Preferred Phone Number on File: [phone]   Best Call Back Number, if different:  Additional Information: Discuss med

## 2025-07-29 NOTE — TELEPHONE ENCOUNTER
Spoke to patient  Informed her that she has a scheduled appt with Ms. Rodarte 9/30/25  Pt is requesting a sooner appt  Pt has been placed on the can list  Pt verbalized understanding  Call ended

## 2025-08-08 ENCOUNTER — PATIENT MESSAGE (OUTPATIENT)
Dept: BEHAVIORAL HEALTH | Facility: CLINIC | Age: 25
End: 2025-08-08
Payer: COMMERCIAL

## 2025-08-08 ENCOUNTER — TELEPHONE (OUTPATIENT)
Dept: BEHAVIORAL HEALTH | Facility: CLINIC | Age: 25
End: 2025-08-08
Payer: COMMERCIAL

## 2025-08-08 NOTE — TELEPHONE ENCOUNTER
Spoke to pt  Req samples on Qelbree  Informed her that she had to try the Intuniv ER, per her insurance to see if it works for her.  Pt verbalized understanding  States she will be picking up Intuniv 2 mg today  Please see attached refill request.    Next scheduled office visit: 9/30/2025.    Last office visit: 6/30/2025.     Thank you!

## 2025-08-08 NOTE — TELEPHONE ENCOUNTER
Left message for rtn call  Sarita Prakash, [facility]  8/8/2025, 8:51 AM

## 2025-08-08 NOTE — TELEPHONE ENCOUNTER
Copied from CRM #[id number]. Topic: General Inquiry - Return Call  >> Aug 8, 2025  9:10 AM Kyle wrote:  .Who Called: Tabby Livingston    Patient is returning phone call    Who Left Message for Patient: AMBER Stein  Does the patient know what this is regarding?:N/A      Preferred Method of Contact: Phone Call  Patient's Preferred Phone Number on File: [phone]     Best Call Back Number, if different:    Additional Information: Pt returning missed call. Please advise, thank you

## 2025-08-08 NOTE — TELEPHONE ENCOUNTER
Copied from CRM #[id number]. Topic: Medications - Medication Question  >> Aug 7, 2025  3:10 PM Mel wrote:  Who Called: Tabby Livingston    Caller is requesting assistance/information from provider's office.    Symptoms (please be specific):   How long has patient had these symptoms:    List of preferred pharmacies on file (remove unneeded): [unfilled]  If different, enter pharmacy into here including location and phone number:         Patient's Preferred Phone Number on File: [phone]   Best Call Back Number, if different:  Additional Information: Patient is requesting a callback from Nurse Stein regarding medication.

## 2025-08-12 ENCOUNTER — E-VISIT (OUTPATIENT)
Dept: ADMINISTRATIVE | Facility: HOSPITAL | Age: 25
End: 2025-08-12
Payer: COMMERCIAL

## 2025-08-12 DIAGNOSIS — F33.1 MAJOR DEPRESSIVE DISORDER, RECURRENT EPISODE, MODERATE: Primary | ICD-10-CM

## 2025-08-12 DIAGNOSIS — F31.4 BIPOLAR DISORDER, CURRENT EPISODE DEPRESSED, SEVERE, WITHOUT PSYCHOTIC FEATURES: Chronic | ICD-10-CM

## 2025-08-12 DIAGNOSIS — F41.9 ANXIETY: ICD-10-CM

## 2025-08-12 DIAGNOSIS — F90.2 ATTENTION DEFICIT HYPERACTIVITY DISORDER (ADHD), COMBINED TYPE: ICD-10-CM

## 2025-08-20 ENCOUNTER — TELEPHONE (OUTPATIENT)
Dept: BEHAVIORAL HEALTH | Facility: CLINIC | Age: 25
End: 2025-08-20
Payer: COMMERCIAL

## 2025-08-26 ENCOUNTER — TELEPHONE (OUTPATIENT)
Dept: FAMILY MEDICINE | Facility: CLINIC | Age: 25
End: 2025-08-26
Payer: COMMERCIAL